# Patient Record
Sex: MALE | Race: WHITE | NOT HISPANIC OR LATINO | Employment: OTHER | URBAN - METROPOLITAN AREA
[De-identification: names, ages, dates, MRNs, and addresses within clinical notes are randomized per-mention and may not be internally consistent; named-entity substitution may affect disease eponyms.]

---

## 2020-12-11 NOTE — PROGRESS NOTES
[facility] Cardiology   Consult  Rock De Guzman  1965    VISIT DATE:  12/15/20    PCP:   Maryjane De Guzman MD  [address] [place] [id number]        CC:  Chest Pain and Abnormal ECG      Problem List:  1.  Hypertriglyceridemia  2.  Chest pain  3.  Hypertension    History of Present Illness:  Rock De Guzman  Is a 55 y.o. male with pertinent cardiac history detailed above.  Patient referred by Dr. Maryjane De Guzman for complaints of exertional chest pain over the two months.  It radiates into both arms more so left than right.  States example such as carrying groceries or walking 100 yards.  Risk factors for CAD include hypertension and hyperlipidemia.  He is on aspirin, beta-blocker, and fenofibrate for triglycerides.  EKGs have inferior T wave inversions this is stable on EKG today compared with last week.  He is prescribed metoprolol but he has not been able to fill it yet.  He is taking aspirin and fenofibrate for his triglycerides..  Feels like he should stop activity when he feels these symptoms, symptoms improve with rest within a minute.  Has not had any rest symptoms.  Blood pressures well controlled today.      There are no active problems to display for this patient.      No Known Allergies    Social History     Socioeconomic History   • Marital status:      Spouse name: Not on file   • Number of children: Not on file   • Years of education: Not on file   • Highest education level: Not on file   Tobacco Use   • Smoking status: Never Smoker   • Smokeless tobacco: Former User     Types: Chew   Substance and Sexual Activity   • Alcohol use: Not Currently   • Drug use: Never       Family History   Problem Relation Age of Onset   • Gallbladder disease Mother    • Liver cancer Father    • Hypertension Sister    • Hypertension Brother    • Diabetes type II Brother        Current Medications:    Current Outpatient Medications:   •  aspirin 325 MG tablet, Take 325 mg by mouth  "Daily., Disp: , Rfl:   •  esomeprazole (nexIUM) 40 MG capsule, Take 40 mg by mouth Every Morning Before Breakfast., Disp: , Rfl:   •  fenofibrate 160 MG tablet, Take 160 mg by mouth Daily., Disp: , Rfl:   •  losartan (COZAAR) 25 MG tablet, Take 25 mg by mouth Daily., Disp: , Rfl:   •  vitamin D3 125 MCG (5000 UT) capsule capsule, Take 5,000 Units by mouth Daily., Disp: , Rfl:   •  icosapent ethyl (Vascepa) 1 g capsule capsule, Take 2 g by mouth 2 (Two) Times a Day With Meals., Disp: 120 capsule, Rfl: 6  •  nitroglycerin (Nitrostat) 0.4 MG SL tablet, Place 1 tablet under the tongue Every 5 (Five) Minutes As Needed for Chest Pain. Take no more than 3 doses in 15 minutes., Disp: 30 tablet, Rfl: 6  •  rosuvastatin (CRESTOR) 10 MG tablet, Take 1 tablet by mouth Daily., Disp: 30 tablet, Rfl: 6     Review of Systems   Cardiovascular: Positive for chest pain. Negative for dyspnea on exertion, near-syncope, palpitations and syncope.   Respiratory: Positive for shortness of breath.    Gastrointestinal: Negative for abdominal pain.   All other systems reviewed and are negative.      Vitals:    12/15/20 1105   BP: 126/80   BP Location: Right arm   Patient Position: Sitting   Cuff Size: Adult   Pulse: 78   SpO2: 98%   Weight: 86.6 kg (191 lb)   Height: 188 cm (74\")       Physical Exam  Constitutional:       Appearance: Normal appearance. He is normal weight.   HENT:      Head: Normocephalic and atraumatic.   Eyes:      Extraocular Movements: Extraocular movements intact.   Neck:      Vascular: No carotid bruit.   Cardiovascular:      Rate and Rhythm: Normal rate and regular rhythm.      Pulses: Normal pulses.   Pulmonary:      Effort: Pulmonary effort is normal.      Breath sounds: Normal breath sounds.   Abdominal:      Palpations: Abdomen is soft.   Musculoskeletal:      Right lower leg: No edema.      Left lower leg: No edema.   Skin:     General: Skin is warm and dry.   Neurological:      General: No focal deficit present. "      Mental Status: He is alert.         Diagnostic Data:    ECG 12 Lead    Date/Time: 12/15/2020 11:32 AM  Performed by: Rocco Tabares MD  Authorized by: Rocco Tabares MD   Comparison: compared with previous ECG from 12/10/2020  Similar to previous ECG  Rhythm: sinus rhythm  Rate: normal  BPM: 78  T flattening: II, III and aVF  QRS axis: normal    Clinical impression: abnormal EKG          No results found for: CHLPL, TRIG, HDL, LDLDIRECT  No results found for: GLUCOSE, BUN, CREATININE, NA, K, CL, CO2, CREATININE, BUN  No results found for: HGBA1C  No results found for: WBC, HGB, HCT, PLT    Assessment:   Diagnosis Plan   1. Angina pectoris (CMS/HCC)  ECG 12 Lead       Plan:      1.  Stable typical Angina  -Fairly stable symptom pattern over the last 2 months  -On ASA, fenofibrate, has not been able fill metoprolol yet but will pick it up  -EKG shows T wave inversions in the inferior leads nonspecific changes in the lateral leads no ST depression or elevation  -Prescribed sublingual nitro with instructions for use  -Discussed with any rest symptoms or progression in symptoms would recommend left heart catheterization.  Recommended left heart catheterization as an option today as well as continue medical management.  Patient would like a period of medical management to see how his symptoms improve with addition of antianginals  -We will see back in about 6 weeks to reevaluate    2.  Hypertension  -currently controlled on losartan    3.  Hyperlipidemia  -Total cholesterol 180, HDL 41, LDL 75, triglycerides 320  - add Vascepa to help reduce TGs, and cardiac risk  -He is on a fenofibrate  -Add low-dose Crestor 10 mg to ensure plaque stabilization    Follow-up in 6 weeks to reevaluate symptoms.  Advised him to call with any progression of symptoms in the interim and we could schedule left heart catheterization          Rocco Tabares MD

## 2020-12-15 ENCOUNTER — CONSULT (OUTPATIENT)
Dept: CARDIOLOGY | Facility: CLINIC | Age: 55
End: 2020-12-15

## 2020-12-15 VITALS
OXYGEN SATURATION: 98 % | HEIGHT: 74 IN | HEART RATE: 78 BPM | SYSTOLIC BLOOD PRESSURE: 126 MMHG | WEIGHT: 191 LBS | BODY MASS INDEX: 24.51 KG/M2 | DIASTOLIC BLOOD PRESSURE: 80 MMHG

## 2020-12-15 DIAGNOSIS — I20.9 ANGINA PECTORIS (HCC): Primary | ICD-10-CM

## 2020-12-15 PROCEDURE — 93000 ELECTROCARDIOGRAM COMPLETE: CPT | Performed by: INTERNAL MEDICINE

## 2020-12-15 PROCEDURE — 99244 OFF/OP CNSLTJ NEW/EST MOD 40: CPT | Performed by: INTERNAL MEDICINE

## 2020-12-15 RX ORDER — FENOFIBRATE 160 MG/1
160 TABLET ORAL DAILY
COMMUNITY

## 2020-12-15 RX ORDER — NITROGLYCERIN 0.4 MG/1
0.4 TABLET SUBLINGUAL
Qty: 30 TABLET | Refills: 6 | Status: SHIPPED | OUTPATIENT
Start: 2020-12-15

## 2020-12-15 RX ORDER — ASPIRIN 325 MG
325 TABLET ORAL DAILY
COMMUNITY
End: 2021-04-08 | Stop reason: ALTCHOICE

## 2020-12-15 RX ORDER — LOSARTAN POTASSIUM 25 MG/1
25 TABLET ORAL DAILY
COMMUNITY

## 2020-12-15 RX ORDER — ICOSAPENT ETHYL 1000 MG/1
2 CAPSULE ORAL 2 TIMES DAILY WITH MEALS
Qty: 120 CAPSULE | Refills: 6 | Status: SHIPPED | OUTPATIENT
Start: 2020-12-15 | End: 2021-02-18

## 2020-12-15 RX ORDER — ROSUVASTATIN CALCIUM 10 MG/1
10 TABLET, COATED ORAL DAILY
Qty: 30 TABLET | Refills: 6 | Status: SHIPPED | OUTPATIENT
Start: 2020-12-15

## 2020-12-15 RX ORDER — ESOMEPRAZOLE MAGNESIUM 40 MG/1
40 CAPSULE, DELAYED RELEASE ORAL
COMMUNITY

## 2021-02-01 ENCOUNTER — PRIOR AUTHORIZATION (OUTPATIENT)
Dept: CARDIOLOGY | Facility: CLINIC | Age: 56
End: 2021-02-01

## 2021-02-17 NOTE — PROGRESS NOTES
[facility]  TeleHealth Follow Up Visit  Rock De Guzman  1965    VISIT DATE:  02/18/21    PCP:   Maryjane De Guzman MD  [address] [place], [place] [id number]    This patient has consented to a telehealth visit via telephone. The visit was scheduled as a telephone visit to comply with patient safety concerns in accordance with [facility] recommendations.  All vitals recorded within this visit are reported by the patient.  I spent  25 minutes in total including but not limited to the 16 minutes spent in direct conversation with this patient.        CC: Stable angina    Problem List:  1.  Hypertriglyceridemia  2.  Stable angina  3.  Hypertension    History of Present Illness:  Rock De Guzman  Is a 55 y.o. male with pertinent cardiac history detailed above.  Patient is following up for stable angina.  Was experiencing episodes of chest pain with carrying groceries or walking 100 yards.  His medical therapy has been adjusted and is currently on aspirin, metoprolol, losartan, statin, as well as a fenofibrate for increased triglycerides.  Since last visit in December he has noted decreased frequency of the chest pain and believes he can exert himself for longer periods before experiencing the chest pain.  He has not needed to take any nitroglycerin.  He has not had any episodes at rest.  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  When he does have the symptoms he feels some chest pressure that radiates into his arms.  His EKG at last visit showed inferior T wave inversions and prominent anterior T waves.  Options for management including upfront cardiac catheterization versus optimization of medical management were discussed at the last visit and he preferred to strategy of medical management.  Now that his chest pain symptoms sound stable will plan for echo and stress testing for further risk stratification      There  are no active problems to display for this patient.      No Known Allergies    Social History     Socioeconomic History   • Marital status:      Spouse name: Not on file   • Number of children: Not on file   • Years of education: Not on file   • Highest education level: Not on file   Tobacco Use   • Smoking status: Never Smoker   • Smokeless tobacco: Former User     Types: Chew   Substance and Sexual Activity   • Alcohol use: Not Currently   • Drug use: Never   • Sexual activity: Defer       Family History   Problem Relation Age of Onset   • Gallbladder disease Mother    • Liver cancer Father    • Hypertension Sister    • Hypertension Brother    • Diabetes type II Brother        Current Medications:    Current Outpatient Medications:   •  aspirin 325 MG tablet, Take 325 mg by mouth Daily., Disp: , Rfl:   •  esomeprazole (nexIUM) 40 MG capsule, Take 40 mg by mouth Every Morning Before Breakfast., Disp: , Rfl:   •  fenofibrate 160 MG tablet, Take 160 mg by mouth Daily., Disp: , Rfl:   •  losartan (COZAAR) 25 MG tablet, Take 25 mg by mouth Daily., Disp: , Rfl:   •  metoprolol tartrate (LOPRESSOR) 25 MG tablet, Take 25 mg by mouth 2 (Two) Times a Day., Disp: , Rfl:   •  nitroglycerin (Nitrostat) 0.4 MG SL tablet, Place 1 tablet under the tongue Every 5 (Five) Minutes As Needed for Chest Pain. Take no more than 3 doses in 15 minutes., Disp: 30 tablet, Rfl: 6  •  rosuvastatin (CRESTOR) 10 MG tablet, Take 1 tablet by mouth Daily., Disp: 30 tablet, Rfl: 6  •  vitamin D3 125 MCG (5000 UT) capsule capsule, Take 5,000 Units by mouth Daily., Disp: , Rfl:      Review of Systems   Cardiovascular: Positive for chest pain (Decreased frequency and severity compared with previous visit). Negative for dyspnea on exertion, leg swelling, near-syncope, orthopnea, palpitations and syncope.   Respiratory: Negative for cough and shortness of breath.        Vitals:    02/18/21 1002   BP: 120/71   BP Location: Left arm   Patient  "Position: Sitting   Pulse: 60   Weight: 104 kg (230 lb 3.2 oz)   Height: 188 cm (74\")       Physical Exam  Pulmonary:      Effort: Pulmonary effort is normal.   Neurological:      Mental Status: He is alert and oriented to person, place, and time.   Psychiatric:         Mood and Affect: Mood normal.         Behavior: Behavior normal.         Diagnostic Data:  Procedures  No results found for: CHLPL, TRIG, HDL, LDLDIRECT  No results found for: GLUCOSE, BUN, CREATININE, NA, K, CL, CO2, CREATININE, BUN  No results found for: HGBA1C  No results found for: WBC, HGB, HCT, PLT    Assessment:   Diagnosis Plan   1. Angina pectoris (CMS/HCC)  Adult Transthoracic Echo Complete W/ Cont if Necessary Per Protocol    Stress Test With Myocardial Perfusion       Plan:    1.  Stable angina  -Fairly stable symptom pattern, symptoms sound to have been improved on increased medical therapy  -On ASA, fenofibrate, metoprolol 25mg BID, Crestor, Omega-3 1000mg BID (vascepa was denied by insurance)  -EKGs from December show T wave inversions in the inferior leads nonspecific changes in the lateral leads  no ST depression or elevation, prominent T waves in the anterior leads  -has sublingual nitro with instructions for use, has not used  -Discussed with any rest symptoms or progression in symptoms would recommend left heart catheterization.   -At this time patient is improving on medical therapy and we will get an echo and exercise nuclear stress test for further stratification     2.  Hypertension  -currently controlled on losartan and metoprolol     3.  Hyperlipidemia  -Total cholesterol 180, HDL 41, LDL 75, triglycerides 320  - On fenofibrate and crestor 10mg  -Omega-3 1000mg BID (vascepa was denied by insurance)  -He will follow up with Dr. De Guzman to get repeat lipids and I asked him to get a repeat EKG as well    We will follow-up with me in 4 to 6 weeks after echo and stress test.  Again reviewed chest pain precautions          Rocco " TWAN Tabares MD

## 2021-02-18 ENCOUNTER — OFFICE VISIT (OUTPATIENT)
Dept: CARDIOLOGY | Facility: CLINIC | Age: 56
End: 2021-02-18

## 2021-02-18 VITALS
WEIGHT: 230.2 LBS | SYSTOLIC BLOOD PRESSURE: 120 MMHG | DIASTOLIC BLOOD PRESSURE: 71 MMHG | HEART RATE: 60 BPM | BODY MASS INDEX: 29.54 KG/M2 | HEIGHT: 74 IN

## 2021-02-18 DIAGNOSIS — I20.9 ANGINA PECTORIS (HCC): Primary | ICD-10-CM

## 2021-02-18 PROCEDURE — 99442 PR PHYS/QHP TELEPHONE EVALUATION 11-20 MIN: CPT | Performed by: INTERNAL MEDICINE

## 2021-03-10 ENCOUNTER — HOSPITAL ENCOUNTER (OUTPATIENT)
Dept: CARDIOLOGY | Facility: HOSPITAL | Age: 56
End: 2021-03-10

## 2021-03-10 ENCOUNTER — APPOINTMENT (OUTPATIENT)
Dept: CARDIOLOGY | Facility: HOSPITAL | Age: 56
End: 2021-03-10

## 2021-03-11 ENCOUNTER — HOSPITAL ENCOUNTER (OUTPATIENT)
Dept: CARDIOLOGY | Facility: HOSPITAL | Age: 56
Discharge: HOME | End: 2021-03-11

## 2021-03-11 ENCOUNTER — PREP FOR SURGERY (OUTPATIENT)
Dept: OTHER | Facility: HOSPITAL | Age: 56
End: 2021-03-11

## 2021-03-11 ENCOUNTER — HOSPITAL ENCOUNTER (OUTPATIENT)
Dept: CARDIOLOGY | Facility: HOSPITAL | Age: 56
Discharge: HOME | End: 2021-03-11
Admitting: INTERNAL MEDICINE

## 2021-03-11 VITALS — BODY MASS INDEX: 29.52 KG/M2 | WEIGHT: 230 LBS | HEIGHT: 74 IN

## 2021-03-11 VITALS
SYSTOLIC BLOOD PRESSURE: 130 MMHG | WEIGHT: 230 LBS | HEIGHT: 74 IN | HEART RATE: 65 BPM | DIASTOLIC BLOOD PRESSURE: 86 MMHG | BODY MASS INDEX: 29.52 KG/M2

## 2021-03-11 DIAGNOSIS — I20.9 ANGINA PECTORIS (HCC): Primary | ICD-10-CM

## 2021-03-11 DIAGNOSIS — I20.9 ANGINA PECTORIS (HCC): ICD-10-CM

## 2021-03-11 LAB
BH CV ECHO MEAS - AO ROOT AREA (BSA CORRECTED): 1.5
BH CV ECHO MEAS - AO ROOT AREA: 8.8 CM^2
BH CV ECHO MEAS - AO ROOT DIAM: 3.3 CM
BH CV ECHO MEAS - BSA(HAYCOCK): 2.3 M^2
BH CV ECHO MEAS - BSA: 2.3 M^2
BH CV ECHO MEAS - BZI_BMI: 29.8 KILOGRAMS/M^2
BH CV ECHO MEAS - BZI_METRIC_HEIGHT: 187 CM
BH CV ECHO MEAS - BZI_METRIC_WEIGHT: 104.3 KG
BH CV ECHO MEAS - EDV(CUBED): 107.7 ML
BH CV ECHO MEAS - EDV(MOD-SP2): 187 ML
BH CV ECHO MEAS - EDV(MOD-SP4): 144 ML
BH CV ECHO MEAS - EDV(TEICH): 105.3 ML
BH CV ECHO MEAS - EF(CUBED): 71.9 %
BH CV ECHO MEAS - EF(MOD-BP): 51 %
BH CV ECHO MEAS - EF(MOD-SP2): 51.7 %
BH CV ECHO MEAS - EF(MOD-SP4): 51.5 %
BH CV ECHO MEAS - EF(TEICH): 63.6 %
BH CV ECHO MEAS - ESV(CUBED): 30.2 ML
BH CV ECHO MEAS - ESV(MOD-SP2): 90.3 ML
BH CV ECHO MEAS - ESV(MOD-SP4): 69.9 ML
BH CV ECHO MEAS - ESV(TEICH): 38.4 ML
BH CV ECHO MEAS - FS: 34.5 %
BH CV ECHO MEAS - IVS/LVPW: 0.88
BH CV ECHO MEAS - IVSD: 0.89 CM
BH CV ECHO MEAS - LA DIMENSION: 4.3 CM
BH CV ECHO MEAS - LA/AO: 1.3
BH CV ECHO MEAS - LAD MAJOR: 4.5 CM
BH CV ECHO MEAS - LAT PEAK E' VEL: 12 CM/SEC
BH CV ECHO MEAS - LATERAL E/E' RATIO: 5.5
BH CV ECHO MEAS - LV DIASTOLIC VOL/BSA (35-75): 62.7 ML/M^2
BH CV ECHO MEAS - LV IVRT: 0.1 SEC
BH CV ECHO MEAS - LV MASS(C)D: 155.9 GRAMS
BH CV ECHO MEAS - LV MASS(C)DI: 67.9 GRAMS/M^2
BH CV ECHO MEAS - LV MAX PG: 3.9 MMHG
BH CV ECHO MEAS - LV MEAN PG: 1.8 MMHG
BH CV ECHO MEAS - LV SYSTOLIC VOL/BSA (12-30): 30.4 ML/M^2
BH CV ECHO MEAS - LV V1 MAX: 99 CM/SEC
BH CV ECHO MEAS - LV V1 MEAN: 59.3 CM/SEC
BH CV ECHO MEAS - LV V1 VTI: 19.8 CM
BH CV ECHO MEAS - LVIDD: 4.8 CM
BH CV ECHO MEAS - LVIDS: 3.1 CM
BH CV ECHO MEAS - LVLD AP2: 10.8 CM
BH CV ECHO MEAS - LVLD AP4: 10.9 CM
BH CV ECHO MEAS - LVLS AP2: 9.7 CM
BH CV ECHO MEAS - LVLS AP4: 9.4 CM
BH CV ECHO MEAS - LVOT AREA (M): 3.1 CM^2
BH CV ECHO MEAS - LVOT AREA: 3.3 CM^2
BH CV ECHO MEAS - LVOT DIAM: 2 CM
BH CV ECHO MEAS - LVPWD: 1 CM
BH CV ECHO MEAS - MED PEAK E' VEL: 7.6 CM/SEC
BH CV ECHO MEAS - MEDIAL E/E' RATIO: 8.7
BH CV ECHO MEAS - MV A MAX VEL: 80.6 CM/SEC
BH CV ECHO MEAS - MV DEC SLOPE: 398.8 CM/SEC^2
BH CV ECHO MEAS - MV DEC TIME: 0.17 SEC
BH CV ECHO MEAS - MV E MAX VEL: 66.4 CM/SEC
BH CV ECHO MEAS - MV E/A: 0.82
BH CV ECHO MEAS - MV MAX PG: 2.6 MMHG
BH CV ECHO MEAS - MV MEAN PG: 1.4 MMHG
BH CV ECHO MEAS - MV P1/2T MAX VEL: 83 CM/SEC
BH CV ECHO MEAS - MV P1/2T: 60.9 MSEC
BH CV ECHO MEAS - MV V2 MAX: 79.9 CM/SEC
BH CV ECHO MEAS - MV V2 MEAN: 55.4 CM/SEC
BH CV ECHO MEAS - MV V2 VTI: 24.4 CM
BH CV ECHO MEAS - MVA P1/2T LCG: 2.7 CM^2
BH CV ECHO MEAS - MVA(P1/2T): 3.6 CM^2
BH CV ECHO MEAS - MVA(VTI): 2.7 CM^2
BH CV ECHO MEAS - PA ACC TIME: 0.11 SEC
BH CV ECHO MEAS - PA PR(ACCEL): 31.5 MMHG
BH CV ECHO MEAS - SI(CUBED): 33.7 ML/M^2
BH CV ECHO MEAS - SI(LVOT): 28.2 ML/M^2
BH CV ECHO MEAS - SI(MOD-SP2): 42.1 ML/M^2
BH CV ECHO MEAS - SI(MOD-SP4): 32.3 ML/M^2
BH CV ECHO MEAS - SI(TEICH): 29.1 ML/M^2
BH CV ECHO MEAS - SV(CUBED): 77.5 ML
BH CV ECHO MEAS - SV(LVOT): 64.8 ML
BH CV ECHO MEAS - SV(MOD-SP2): 96.7 ML
BH CV ECHO MEAS - SV(MOD-SP4): 74.1 ML
BH CV ECHO MEAS - SV(TEICH): 67 ML
BH CV ECHO MEAS - TAPSE (>1.6): 2 CM
BH CV ECHO MEASUREMENTS AVERAGE E/E' RATIO: 6.78
BH CV REST NUCLEAR ISOTOPE DOSE: 9.7 MCI
BH CV STRESS BP STAGE 1: NORMAL
BH CV STRESS BP STAGE 2: NORMAL
BH CV STRESS BP STAGE 3: NORMAL
BH CV STRESS DURATION MIN STAGE 1: 3
BH CV STRESS DURATION MIN STAGE 2: 3
BH CV STRESS DURATION MIN STAGE 3: 3
BH CV STRESS DURATION SEC STAGE 1: 0
BH CV STRESS DURATION SEC STAGE 2: 0
BH CV STRESS DURATION SEC STAGE 3: 21
BH CV STRESS GRADE STAGE 1: 10
BH CV STRESS GRADE STAGE 2: 12
BH CV STRESS GRADE STAGE 3: 14
BH CV STRESS HR STAGE 1: 109
BH CV STRESS HR STAGE 2: 123
BH CV STRESS HR STAGE 3: 137
BH CV STRESS METS STAGE 1: 5
BH CV STRESS METS STAGE 2: 7.5
BH CV STRESS METS STAGE 3: 10
BH CV STRESS NUCLEAR ISOTOPE DOSE: 33 MCI
BH CV STRESS O2 STAGE 1: 98
BH CV STRESS O2 STAGE 2: 99
BH CV STRESS O2 STAGE 3: 99
BH CV STRESS PROTOCOL 1: NORMAL
BH CV STRESS RECOVERY BP: NORMAL MMHG
BH CV STRESS RECOVERY HR: 80 BPM
BH CV STRESS RECOVERY O2: 97 %
BH CV STRESS SPEED STAGE 1: 1.7
BH CV STRESS SPEED STAGE 2: 2.5
BH CV STRESS SPEED STAGE 3: 3.4
BH CV STRESS STAGE 1: 1
BH CV STRESS STAGE 2: 2
BH CV STRESS STAGE 3: 3
BH CV VAS BP LEFT ARM: NORMAL MMHG
BH CV XLRA - RV BASE: 4.6 CM
BH CV XLRA - RV LENGTH: 7.9 CM
BH CV XLRA - RV MID: 2.5 CM
BH CV XLRA - TDI S': 15.3 CM/SEC
LEFT ATRIUM VOLUME INDEX: 22.4 ML/M^2
LEFT ATRIUM VOLUME: 51.5 ML
LV EF NUC BP: 60 %
MAXIMAL PREDICTED HEART RATE: 165 BPM
MAXIMAL PREDICTED HEART RATE: 165 BPM
PERCENT MAX PREDICTED HR: 84.24 %
STRESS BASELINE BP: NORMAL MMHG
STRESS BASELINE HR: 85 BPM
STRESS O2 SAT REST: 98 %
STRESS PERCENT HR: 99 %
STRESS POST ESTIMATED WORKLOAD: 10.1 METS
STRESS POST EXERCISE DUR MIN: 9 MIN
STRESS POST EXERCISE DUR SEC: 21 SEC
STRESS POST O2 SAT PEAK: 99 %
STRESS POST PEAK BP: NORMAL MMHG
STRESS POST PEAK HR: 139 BPM
STRESS TARGET HR: 140 BPM
STRESS TARGET HR: 140 BPM

## 2021-03-11 PROCEDURE — 78452 HT MUSCLE IMAGE SPECT MULT: CPT | Performed by: INTERNAL MEDICINE

## 2021-03-11 PROCEDURE — 93017 CV STRESS TEST TRACING ONLY: CPT

## 2021-03-11 PROCEDURE — 78452 HT MUSCLE IMAGE SPECT MULT: CPT

## 2021-03-11 PROCEDURE — A9500 TC99M SESTAMIBI: HCPCS | Performed by: INTERNAL MEDICINE

## 2021-03-11 PROCEDURE — 93018 CV STRESS TEST I&R ONLY: CPT | Performed by: INTERNAL MEDICINE

## 2021-03-11 PROCEDURE — 0 TECHNETIUM SESTAMIBI: Performed by: INTERNAL MEDICINE

## 2021-03-11 PROCEDURE — 93306 TTE W/DOPPLER COMPLETE: CPT | Performed by: INTERNAL MEDICINE

## 2021-03-11 PROCEDURE — 93306 TTE W/DOPPLER COMPLETE: CPT

## 2021-03-11 RX ORDER — CLOPIDOGREL BISULFATE 75 MG/1
600 TABLET ORAL ONCE
Status: CANCELLED | OUTPATIENT
Start: 2021-03-11 | End: 2021-03-11

## 2021-03-11 RX ORDER — CLOPIDOGREL BISULFATE 75 MG/1
75 TABLET ORAL DAILY
Status: CANCELLED | OUTPATIENT
Start: 2021-03-12

## 2021-03-11 RX ORDER — SODIUM CHLORIDE 0.9 % (FLUSH) 0.9 %
3 SYRINGE (ML) INJECTION EVERY 12 HOURS SCHEDULED
Status: CANCELLED | OUTPATIENT
Start: 2021-03-11

## 2021-03-11 RX ORDER — SODIUM CHLORIDE 0.9 % (FLUSH) 0.9 %
10 SYRINGE (ML) INJECTION AS NEEDED
Status: CANCELLED | OUTPATIENT
Start: 2021-03-11

## 2021-03-11 RX ORDER — ACETAMINOPHEN 325 MG/1
650 TABLET ORAL EVERY 4 HOURS PRN
Status: CANCELLED | OUTPATIENT
Start: 2021-03-11

## 2021-03-11 RX ORDER — NITROGLYCERIN 0.4 MG/1
0.4 TABLET SUBLINGUAL
Status: CANCELLED | OUTPATIENT
Start: 2021-03-11

## 2021-03-11 RX ADMIN — TECHNETIUM TC 99M SESTAMIBI 1 DOSE: 1 INJECTION INTRAVENOUS at 10:35

## 2021-03-11 RX ADMIN — TECHNETIUM TC 99M SESTAMIBI 1 DOSE: 1 INJECTION INTRAVENOUS at 12:40

## 2021-03-12 ENCOUNTER — HOSPITAL ENCOUNTER (OUTPATIENT)
Facility: HOSPITAL | Age: 56
Discharge: HOME | End: 2021-03-12
Attending: INTERNAL MEDICINE | Admitting: INTERNAL MEDICINE

## 2021-03-12 VITALS
WEIGHT: 237.8 LBS | SYSTOLIC BLOOD PRESSURE: 134 MMHG | TEMPERATURE: 97.8 F | RESPIRATION RATE: 18 BRPM | HEIGHT: 74 IN | OXYGEN SATURATION: 91 % | HEART RATE: 89 BPM | BODY MASS INDEX: 30.52 KG/M2 | DIASTOLIC BLOOD PRESSURE: 102 MMHG

## 2021-03-12 DIAGNOSIS — I20.9 ANGINA PECTORIS (HCC): ICD-10-CM

## 2021-03-12 LAB
ALBUMIN SERPL-MCNC: 4.3 G/DL (ref 3.5–5.2)
ALBUMIN/GLOB SERPL: 2.2 G/DL
ALP SERPL-CCNC: 68 U/L (ref 39–117)
ALT SERPL W P-5'-P-CCNC: 38 U/L (ref 1–41)
ANION GAP SERPL CALCULATED.3IONS-SCNC: 11 MMOL/L (ref 5–15)
AST SERPL-CCNC: 25 U/L (ref 1–40)
BILIRUB SERPL-MCNC: 0.3 MG/DL (ref 0–1.2)
BUN SERPL-MCNC: 22 MG/DL (ref 6–20)
BUN/CREAT SERPL: 18.8 (ref 7–25)
CALCIUM SPEC-SCNC: 9.2 MG/DL (ref 8.6–10.5)
CHLORIDE SERPL-SCNC: 106 MMOL/L (ref 98–107)
CHOLEST SERPL-MCNC: 96 MG/DL (ref 0–200)
CO2 SERPL-SCNC: 24 MMOL/L (ref 22–29)
CREAT SERPL-MCNC: 1.17 MG/DL (ref 0.76–1.27)
DEPRECATED RDW RBC AUTO: 39.8 FL (ref 37–54)
ERYTHROCYTE [DISTWIDTH] IN BLOOD BY AUTOMATED COUNT: 12.6 % (ref 12.3–15.4)
GFR SERPL CREATININE-BSD FRML MDRD: 65 ML/MIN/1.73
GLOBULIN UR ELPH-MCNC: 2 GM/DL
GLUCOSE SERPL-MCNC: 127 MG/DL (ref 65–99)
HBA1C MFR BLD: 6.3 % (ref 4.8–5.6)
HCT VFR BLD AUTO: 39.7 % (ref 37.5–51)
HDLC SERPL-MCNC: 37 MG/DL (ref 40–60)
HGB BLD-MCNC: 13.1 G/DL (ref 13–17.7)
LDLC SERPL CALC-MCNC: 35 MG/DL (ref 0–100)
LDLC/HDLC SERPL: 0.87 {RATIO}
MCH RBC QN AUTO: 28.6 PG (ref 26.6–33)
MCHC RBC AUTO-ENTMCNC: 33 G/DL (ref 31.5–35.7)
MCV RBC AUTO: 86.7 FL (ref 79–97)
PLATELET # BLD AUTO: 207 10*3/MM3 (ref 140–450)
PMV BLD AUTO: 9.8 FL (ref 6–12)
POTASSIUM SERPL-SCNC: 4.2 MMOL/L (ref 3.5–5.2)
PROT SERPL-MCNC: 6.3 G/DL (ref 6–8.5)
RBC # BLD AUTO: 4.58 10*6/MM3 (ref 4.14–5.8)
SODIUM SERPL-SCNC: 141 MMOL/L (ref 136–145)
TRIGL SERPL-MCNC: 134 MG/DL (ref 0–150)
VLDLC SERPL-MCNC: 24 MG/DL (ref 5–40)
WBC # BLD AUTO: 6.63 10*3/MM3 (ref 3.4–10.8)

## 2021-03-12 PROCEDURE — 93458 L HRT ARTERY/VENTRICLE ANGIO: CPT | Performed by: INTERNAL MEDICINE

## 2021-03-12 PROCEDURE — 25010000002 HEPARIN (PORCINE) PER 1000 UNITS: Performed by: INTERNAL MEDICINE

## 2021-03-12 PROCEDURE — 80061 LIPID PANEL: CPT | Performed by: PHYSICIAN ASSISTANT

## 2021-03-12 PROCEDURE — 25010000002 FENTANYL CITRATE (PF) 100 MCG/2ML SOLUTION: Performed by: INTERNAL MEDICINE

## 2021-03-12 PROCEDURE — 25010000002 ONDANSETRON PER 1 MG: Performed by: INTERNAL MEDICINE

## 2021-03-12 PROCEDURE — 80053 COMPREHEN METABOLIC PANEL: CPT | Performed by: PHYSICIAN ASSISTANT

## 2021-03-12 PROCEDURE — 83036 HEMOGLOBIN GLYCOSYLATED A1C: CPT | Performed by: PHYSICIAN ASSISTANT

## 2021-03-12 PROCEDURE — 85027 COMPLETE CBC AUTOMATED: CPT | Performed by: PHYSICIAN ASSISTANT

## 2021-03-12 PROCEDURE — 0 IOPAMIDOL PER 1 ML: Performed by: INTERNAL MEDICINE

## 2021-03-12 PROCEDURE — 25010000002 MIDAZOLAM PER 1 MG: Performed by: INTERNAL MEDICINE

## 2021-03-12 PROCEDURE — C1887 CATHETER, GUIDING: HCPCS | Performed by: INTERNAL MEDICINE

## 2021-03-12 PROCEDURE — 85347 COAGULATION TIME ACTIVATED: CPT

## 2021-03-12 PROCEDURE — C1769 GUIDE WIRE: HCPCS | Performed by: INTERNAL MEDICINE

## 2021-03-12 PROCEDURE — C1894 INTRO/SHEATH, NON-LASER: HCPCS | Performed by: INTERNAL MEDICINE

## 2021-03-12 PROCEDURE — C1760 CLOSURE DEV, VASC: HCPCS | Performed by: INTERNAL MEDICINE

## 2021-03-12 PROCEDURE — 25010000002 MORPHINE PER 10 MG: Performed by: INTERNAL MEDICINE

## 2021-03-12 RX ORDER — NITROGLYCERIN 0.4 MG/1
0.4 TABLET SUBLINGUAL
Status: DISCONTINUED | OUTPATIENT
Start: 2021-03-12 | End: 2021-03-12 | Stop reason: HOSPADM

## 2021-03-12 RX ORDER — SODIUM CHLORIDE 0.9 % (FLUSH) 0.9 %
10 SYRINGE (ML) INJECTION AS NEEDED
Status: DISCONTINUED | OUTPATIENT
Start: 2021-03-12 | End: 2021-03-12 | Stop reason: HOSPADM

## 2021-03-12 RX ORDER — CHLORAL HYDRATE 500 MG
1000 CAPSULE ORAL 2 TIMES DAILY WITH MEALS
COMMUNITY

## 2021-03-12 RX ORDER — LIDOCAINE HYDROCHLORIDE 10 MG/ML
INJECTION, SOLUTION EPIDURAL; INFILTRATION; INTRACAUDAL; PERINEURAL AS NEEDED
Status: DISCONTINUED | OUTPATIENT
Start: 2021-03-12 | End: 2021-03-12 | Stop reason: HOSPADM

## 2021-03-12 RX ORDER — HEPARIN SODIUM 1000 [USP'U]/ML
INJECTION, SOLUTION INTRAVENOUS; SUBCUTANEOUS AS NEEDED
Status: DISCONTINUED | OUTPATIENT
Start: 2021-03-12 | End: 2021-03-12 | Stop reason: HOSPADM

## 2021-03-12 RX ORDER — ACETAMINOPHEN 325 MG/1
650 TABLET ORAL EVERY 4 HOURS PRN
Status: DISCONTINUED | OUTPATIENT
Start: 2021-03-12 | End: 2021-03-12 | Stop reason: HOSPADM

## 2021-03-12 RX ORDER — MORPHINE SULFATE 2 MG/ML
2 INJECTION, SOLUTION INTRAMUSCULAR; INTRAVENOUS ONCE
Status: COMPLETED | OUTPATIENT
Start: 2021-03-12 | End: 2021-03-12

## 2021-03-12 RX ORDER — CLOPIDOGREL BISULFATE 75 MG/1
600 TABLET ORAL ONCE
Status: COMPLETED | OUTPATIENT
Start: 2021-03-12 | End: 2021-03-12

## 2021-03-12 RX ORDER — CLOPIDOGREL BISULFATE 75 MG/1
75 TABLET ORAL DAILY
Status: DISCONTINUED | OUTPATIENT
Start: 2021-03-13 | End: 2021-03-12 | Stop reason: HOSPADM

## 2021-03-12 RX ORDER — MIDAZOLAM HYDROCHLORIDE 1 MG/ML
INJECTION INTRAMUSCULAR; INTRAVENOUS AS NEEDED
Status: DISCONTINUED | OUTPATIENT
Start: 2021-03-12 | End: 2021-03-12 | Stop reason: HOSPADM

## 2021-03-12 RX ORDER — FENTANYL CITRATE 50 UG/ML
INJECTION, SOLUTION INTRAMUSCULAR; INTRAVENOUS AS NEEDED
Status: DISCONTINUED | OUTPATIENT
Start: 2021-03-12 | End: 2021-03-12 | Stop reason: HOSPADM

## 2021-03-12 RX ORDER — ONDANSETRON 2 MG/ML
4 INJECTION INTRAMUSCULAR; INTRAVENOUS EVERY 6 HOURS PRN
Status: DISCONTINUED | OUTPATIENT
Start: 2021-03-12 | End: 2021-03-12 | Stop reason: HOSPADM

## 2021-03-12 RX ORDER — SODIUM CHLORIDE 0.9 % (FLUSH) 0.9 %
3 SYRINGE (ML) INJECTION EVERY 12 HOURS SCHEDULED
Status: DISCONTINUED | OUTPATIENT
Start: 2021-03-12 | End: 2021-03-12 | Stop reason: HOSPADM

## 2021-03-12 RX ADMIN — CLOPIDOGREL BISULFATE 600 MG: 75 TABLET ORAL at 08:31

## 2021-03-12 RX ADMIN — MORPHINE SULFATE 2 MG: 2 INJECTION, SOLUTION INTRAMUSCULAR; INTRAVENOUS at 11:45

## 2021-03-12 RX ADMIN — ONDANSETRON 4 MG: 2 INJECTION INTRAMUSCULAR; INTRAVENOUS at 11:45

## 2021-03-12 NOTE — INTERVAL H&P NOTE
"  H&P reviewed. The patient was examined and the following changes are noted:  · Patient underwent stress MPS which was abnormal as noted below demonstrating ischemia in inferior, lateral and basal inferior lateral walls, normal LVEF.   · He presents for cardiac catheterization today      Vitals and Labs today:  Vitals:    03/12/21 0717 03/12/21 0719   BP: 125/78 119/76   BP Location: Right arm Left arm   Pulse: 69 70   Temp: 97.8 °F (36.6 °C)    TempSrc: Tympanic    SpO2:  96%   Weight: 108 kg (237 lb 12.8 oz)    Height: 188 cm (74\")      Lab Results   Component Value Date    WBC 6.63 03/12/2021    HGB 13.1 03/12/2021    HCT 39.7 03/12/2021    MCV 86.7 03/12/2021     03/12/2021     Lab Results   Component Value Date    GLUCOSE 127 (H) 03/12/2021    BUN 22 (H) 03/12/2021    CREATININE 1.17 03/12/2021    EGFRIFNONA 65 03/12/2021    BCR 18.8 03/12/2021    K 4.2 03/12/2021    CO2 24.0 03/12/2021    CALCIUM 9.2 03/12/2021    ALBUMIN 4.30 03/12/2021    AST 25 03/12/2021    ALT 38 03/12/2021     Lab Results   Component Value Date    CHOL 96 03/12/2021    TRIG 134 03/12/2021    HDL 37 (L) 03/12/2021    LDL 35 03/12/2021     No results found for: HGBA1C    Echo 3/11/21:  Interpretation Summary    · Left ventricular ejection fraction appears to be 51 - 55%. Left ventricular systolic function is normal.  · There are no significant valvular abnormalities noted     Stress MPS 3/11/21:  Interpretation Summary    · Patient did complain of left sided chest pain 2/10 and bilateral arm heaviness in stage II. The patient had to stop secondary to fatigue. Chest pain resolved in recovery.  ·  which he was unable to reach secondary to fatigue. Did get HR to 139 (84% of target)  · There was 1 mm of horizontal ST depression in lead I, lead aVL, lead V2 during exercise. This was resolved by 5:50 into recovery  · Myocardial perfusion imaging indicates a medium-to-large-sized, severe area of ischemia located in the inferior " wall, lateral wall and basal inferior lateral wall.  · Left ventricular ejection fraction is normal. (Calculated EF = 60%). There is inferior, inferolateral, and lateral hypokinesis  · There is no significant coronary artery calcium on the CT portion of the exam  · Impressions are consistent with an intermediate risk study.

## 2021-03-15 ENCOUNTER — DOCUMENTATION (OUTPATIENT)
Dept: CARDIAC REHAB | Facility: HOSPITAL | Age: 56
End: 2021-03-15

## 2021-03-15 LAB
ACT BLD: 235 SECONDS (ref 82–152)
ACT BLD: 252 SECONDS (ref 82–152)

## 2021-04-08 ENCOUNTER — OFFICE VISIT (OUTPATIENT)
Dept: CARDIOLOGY | Facility: CLINIC | Age: 56
End: 2021-04-08

## 2021-04-08 VITALS
OXYGEN SATURATION: 97 % | DIASTOLIC BLOOD PRESSURE: 66 MMHG | TEMPERATURE: 97.5 F | BODY MASS INDEX: 31.06 KG/M2 | HEART RATE: 60 BPM | SYSTOLIC BLOOD PRESSURE: 110 MMHG | HEIGHT: 74 IN | WEIGHT: 242 LBS

## 2021-04-08 DIAGNOSIS — I20.9 ANGINA PECTORIS (HCC): Primary | ICD-10-CM

## 2021-04-08 PROCEDURE — 99214 OFFICE O/P EST MOD 30 MIN: CPT | Performed by: INTERNAL MEDICINE

## 2021-04-08 RX ORDER — IBUPROFEN 400 MG/1
400 TABLET ORAL AS NEEDED
COMMUNITY
End: 2021-10-12

## 2021-04-08 RX ORDER — ASPIRIN 81 MG/1
81 TABLET ORAL DAILY
Qty: 30 TABLET | Refills: 6 | Status: SHIPPED | OUTPATIENT
Start: 2021-04-08

## 2021-04-08 NOTE — PROGRESS NOTES
[facility] Cardiology  Follow Up Visit  Chris De Guzman  1965    VISIT DATE:  04/08/21    PCP:   Maryjane De Guzman MD  [address] [place], [place] [id number]          CC:  Chest Pain      Problem List:  1. Coronary artery disease   -Left heart catheterization March 2021:  of the RCA with  well-developed left-to-right,     collaterals, no significant LAD or circumflex disease, EF 55%   -Echo EF 51-55%, no significant valvular abnormalities  2. Hyperlipidemia/hypertriglyceridemia  3.  Hypertension    History of Present Illness:  Chris De Guzman  Is a 56 y.o. male with pertinent cardiac history detailed above. Patient is following up for CAD and stable angina. Heart catheterization just demonstrated one-vessel CAD with a right coronary artery .  Doing well from a chest pain stand point.  Did have a right femoral hematoma post cath.  This has now resolved.  Blood pressure, lipids are well controlled.  Remaining active.      Patient Active Problem List    Diagnosis Date Noted   • Angina pectoris (CMS/[facility]) 03/11/2021     Note Last Updated: 3/11/2021     Added automatically from request for surgery [id number]         No Known Allergies    Social History     Socioeconomic History   • Marital status:      Spouse name: Not on file   • Number of children: Not on file   • Years of education: Not on file   • Highest education level: Not on file   Tobacco Use   • Smoking status: Never Smoker   • Smokeless tobacco: Former User     Types: Chew   Substance and Sexual Activity   • Alcohol use: Not Currently   • Drug use: Never   • Sexual activity: Defer       Family History   Problem Relation Age of Onset   • Gallbladder disease Mother    • Liver cancer Father    • Hypertension Sister    • Hypertension Brother    • Diabetes type II Brother        Current Medications:    Current Outpatient Medications:   •  esomeprazole (nexIUM) 40 MG capsule, Take 40 mg by mouth Every Morning Before  "Breakfast., Disp: , Rfl:   •  fenofibrate 160 MG tablet, Take 160 mg by mouth Daily., Disp: , Rfl:   •  ibuprofen (ADVIL,MOTRIN) 400 MG tablet, Take 400 mg by mouth As Needed., Disp: , Rfl:   •  losartan (COZAAR) 25 MG tablet, Take 25 mg by mouth Daily., Disp: , Rfl:   •  metoprolol tartrate (LOPRESSOR) 25 MG tablet, Take 25 mg by mouth 2 (Two) Times a Day., Disp: , Rfl:   •  nitroglycerin (Nitrostat) 0.4 MG SL tablet, Place 1 tablet under the tongue Every 5 (Five) Minutes As Needed for Chest Pain. Take no more than 3 doses in 15 minutes., Disp: 30 tablet, Rfl: 6  •  Omega-3 Fatty Acids (fish oil) 1000 MG capsule capsule, Take 1,000 mg by mouth 2 (Two) Times a Day With Meals., Disp: , Rfl:   •  rosuvastatin (CRESTOR) 10 MG tablet, Take 1 tablet by mouth Daily., Disp: 30 tablet, Rfl: 6  •  vitamin D3 125 MCG (5000 UT) capsule capsule, Take 5,000 Units by mouth Daily., Disp: , Rfl:   •  aspirin 81 MG EC tablet, Take 1 tablet by mouth Daily., Disp: 30 tablet, Rfl: 6     Review of Systems   Cardiovascular: Negative for chest pain, dyspnea on exertion, irregular heartbeat, near-syncope, palpitations and syncope.   Respiratory: Negative for shortness of breath.        Vitals:    04/08/21 0926   BP: 110/66   BP Location: Left arm   Patient Position: Sitting   Pulse: 60   Temp: 97.5 °F (36.4 °C)   SpO2: 97%   Weight: 110 kg (242 lb)   Height: 188 cm (74\")       Physical Exam  Constitutional:       Appearance: Normal appearance.   Neck:      Vascular: No carotid bruit.   Cardiovascular:      Rate and Rhythm: Normal rate and regular rhythm.      Pulses: Normal pulses.      Heart sounds: Normal heart sounds. No murmur heard.        Comments: 2+ right femoral pulse, no residual hematoma  Pulmonary:      Effort: Pulmonary effort is normal.      Breath sounds: No rales.   Musculoskeletal:      Right lower leg: No edema.      Left lower leg: No edema.   Neurological:      General: No focal deficit present.      Mental Status: He " is alert.         Diagnostic Data:  Procedures  Lab Results   Component Value Date    TRIG 134 03/12/2021    HDL 37 (L) 03/12/2021     Lab Results   Component Value Date    GLUCOSE 127 (H) 03/12/2021    BUN 22 (H) 03/12/2021    CREATININE 1.17 03/12/2021     03/12/2021    K 4.2 03/12/2021     03/12/2021    CO2 24.0 03/12/2021     Lab Results   Component Value Date    HGBA1C 6.30 (H) 03/12/2021     Lab Results   Component Value Date    WBC 6.63 03/12/2021    HGB 13.1 03/12/2021    HCT 39.7 03/12/2021     03/12/2021       Assessment:   Diagnosis Plan   1. Angina pectoris (CMS/[facility])         Plan:    1.  Stable angina  -Heart catheterization with single CAD, RCA  with good left to right collaterals  -On ASA, fenofibrate, metoprolol 25mg BID, Crestor, Omega-3 1000mg BID (vascepa was denied by insurance)  -Currently no angina     2.  Hypertension  -currently controlled on losartan and metoprolol     3.  Hyperlipidemia  -Total cholesterol 96, LDL 35, triglycerides 134  - On fenofibrate and crestor 10mg  -Omega-3 1000mg BID (vascepa was denied by insurance)    4.   Borderline DM  -following up with Dr. De Guzman    We will see back in 6 months, sooner as needed      Rocco Tabares MD [facility]

## 2021-09-22 ENCOUNTER — PATIENT MESSAGE (OUTPATIENT)
Dept: CARDIOLOGY | Facility: CLINIC | Age: 56
End: 2021-09-22

## 2021-10-11 NOTE — PROGRESS NOTES
[facility] Cardiology  Follow Up Visit  Chris De Guzman  1965    VISIT DATE:  10/12/21    PCP:   Marge Antony, APRN  [address] [place] [facility] [address] [address]          CC:  Angina pectoris (CMS/HCC      Problem List:  1. Coronary artery disease              -Left heart catheterization March 2021:  of the RCA with well-developed left-to-right,                collaterals, no significant LAD or circumflex disease, EF 55%              -Echo EF 51-55%, no significant valvular abnormalities  2. Hyperlipidemia/hypertriglyceridemia  3.  Hypertension     History of Present Illness:  Chris De Guzman  Is a 56 y.o. male with pertinent cardiac history detailed above.  He will have some occasional mild angina symptoms if he is shuttling groceries.  He states symptoms leave within two minutes.  He does not have to rest to make it stop, but it does improve with rest.  No rest symptoms.  He has not needed any SL nitro, never felt anything severe enough to try one.  No shortness of breath.  His other risk factors appear well-controlled blood pressure is doing well.  Lipids and triglycerides are doing well.  Triglycerides are much improved compared with this time last year.  Other issue is pain and arthritis in his hands worse at the end of the day.      Patient Active Problem List    Diagnosis Date Noted   • Angina pectoris (HCC) 03/11/2021     Note Last Updated: 3/11/2021     Added automatically from request for surgery [id number]         No Known Allergies    Social History     Socioeconomic History   • Marital status:    Tobacco Use   • Smoking status: Never Smoker   • Smokeless tobacco: Former User     Types: Chew   Substance and Sexual Activity   • Alcohol use: Not Currently     Alcohol/week: 0.0 standard drinks   • Drug use: Never   • Sexual activity: Yes     Partners: Female       Family History   Problem Relation Age of Onset   • Gallbladder disease Mother    • Liver cancer Father   "  • Hypertension Sister    • Hypertension Brother    • Diabetes type II Brother        Current Medications:    Current Outpatient Medications:   •  acetaminophen (TYLENOL) 325 MG tablet, Take 650 mg by mouth Every 6 (Six) Hours As Needed for Mild Pain ., Disp: , Rfl:   •  aspirin 81 MG EC tablet, Take 1 tablet by mouth Daily., Disp: 30 tablet, Rfl: 6  •  esomeprazole (nexIUM) 40 MG capsule, Take 40 mg by mouth Every Morning Before Breakfast., Disp: , Rfl:   •  fenofibrate 160 MG tablet, Take 160 mg by mouth Daily., Disp: , Rfl:   •  losartan (COZAAR) 25 MG tablet, Take 25 mg by mouth Daily., Disp: , Rfl:   •  metoprolol tartrate (LOPRESSOR) 25 MG tablet, Take 1 tablet by mouth 2 (Two) Times a Day., Disp: 180 tablet, Rfl: 1  •  nitroglycerin (Nitrostat) 0.4 MG SL tablet, Place 1 tablet under the tongue Every 5 (Five) Minutes As Needed for Chest Pain. Take no more than 3 doses in 15 minutes., Disp: 30 tablet, Rfl: 6  •  Omega-3 Fatty Acids (fish oil) 1000 MG capsule capsule, Take 1,000 mg by mouth 2 (Two) Times a Day With Meals., Disp: , Rfl:   •  rosuvastatin (CRESTOR) 10 MG tablet, Take 1 tablet by mouth Daily., Disp: 30 tablet, Rfl: 6  •  vitamin D3 125 MCG (5000 UT) capsule capsule, Take 5,000 Units by mouth Daily., Disp: , Rfl:   •  isosorbide mononitrate (IMDUR) 30 MG 24 hr tablet, Take 1 tablet by mouth Daily., Disp: 30 tablet, Rfl: 11     Review of Systems   Constitutional: Negative for diaphoresis.   Cardiovascular: Positive for chest pain. Negative for dyspnea on exertion, irregular heartbeat, near-syncope, orthopnea, palpitations and syncope.   Musculoskeletal: Positive for arthritis.       Vitals:    10/12/21 1039   BP: 118/72   BP Location: Right arm   Patient Position: Sitting   Pulse: 74   SpO2: 96%   Weight: 109 kg (240 lb 6.4 oz)   Height: 188 cm (74\")       Physical Exam  Constitutional:       Appearance: Normal appearance.   Neck:      Vascular: No carotid bruit.   Cardiovascular:      Rate and " Rhythm: Normal rate and regular rhythm.      Pulses: Normal pulses.      Heart sounds: Normal heart sounds.   Pulmonary:      Effort: Pulmonary effort is normal.      Breath sounds: Normal breath sounds.   Musculoskeletal:      Right lower leg: No edema.      Left lower leg: No edema.   Skin:     General: Skin is warm and dry.   Neurological:      General: No focal deficit present.      Mental Status: He is alert.         Diagnostic Data:  Procedures  Lab Results   Component Value Date    TRIG 134 03/12/2021    HDL 37 (L) 03/12/2021     Lab Results   Component Value Date    GLUCOSE 127 (H) 03/12/2021    BUN 22 (H) 03/12/2021    CREATININE 1.17 03/12/2021     03/12/2021    K 4.2 03/12/2021     03/12/2021    CO2 24.0 03/12/2021     Lab Results   Component Value Date    HGBA1C 6.30 (H) 03/12/2021     Lab Results   Component Value Date    WBC 6.63 03/12/2021    HGB 13.1 03/12/2021    HCT 39.7 03/12/2021     03/12/2021       Assessment:  No diagnosis found.    Plan:    1.  Stable angina  -Heart catheterization RCA  with good left to right collaterals  -On ASA, fenofibrate, metoprolol 25mg BID, Crestor, Omega-3 1000mg BID (vascepa was denied by insurance)  -Has some mild exertional angina symptoms.  Had Imdur 30 mg for hopeful symptomatic improvement.  Will titrate as needed/able  -Would not recommend attempt at RCA revascularization at this time      2.  Hypertension  -currently controlled on losartan and metoprolol     3.  Hyperlipidemia  -Total cholesterol  97, LDL 27, HDL 35, triglycerides 177  - On fenofibrate and crestor 10mg  -Omega-3 1000mg BID (vascepa was denied by insurance)    Follow-up in the spring      Rocco Tabares MD [facility]

## 2021-10-12 ENCOUNTER — OFFICE VISIT (OUTPATIENT)
Dept: CARDIOLOGY | Facility: CLINIC | Age: 56
End: 2021-10-12

## 2021-10-12 VITALS
SYSTOLIC BLOOD PRESSURE: 118 MMHG | DIASTOLIC BLOOD PRESSURE: 72 MMHG | HEIGHT: 74 IN | OXYGEN SATURATION: 96 % | WEIGHT: 240.4 LBS | HEART RATE: 74 BPM | BODY MASS INDEX: 30.85 KG/M2

## 2021-10-12 DIAGNOSIS — I20.9 ANGINA PECTORIS (HCC): Primary | ICD-10-CM

## 2021-10-12 PROCEDURE — 99214 OFFICE O/P EST MOD 30 MIN: CPT | Performed by: INTERNAL MEDICINE

## 2021-10-12 RX ORDER — ISOSORBIDE MONONITRATE 30 MG/1
30 TABLET, EXTENDED RELEASE ORAL DAILY
Qty: 30 TABLET | Refills: 11 | Status: SHIPPED | OUTPATIENT
Start: 2021-10-12 | End: 2021-11-02 | Stop reason: SDUPTHER

## 2021-10-12 RX ORDER — ACETAMINOPHEN 325 MG/1
650 TABLET ORAL EVERY 6 HOURS PRN
COMMUNITY

## 2021-11-02 ENCOUNTER — TELEPHONE (OUTPATIENT)
Dept: CARDIOLOGY | Facility: CLINIC | Age: 56
End: 2021-11-02

## 2021-11-02 RX ORDER — ISOSORBIDE MONONITRATE 60 MG/1
60 TABLET, EXTENDED RELEASE ORAL DAILY
Qty: 90 TABLET | Refills: 3 | Status: SHIPPED | OUTPATIENT
Start: 2021-11-02 | End: 2022-03-15 | Stop reason: SDUPTHER

## 2021-11-02 NOTE — TELEPHONE ENCOUNTER
----- Message from Chris De Guzman sent at 11/1/2021 11:09 PM EDT -----  Regarding: Isosorbide script   Since I’ve doubled dosage, I’m almost out of the isosorbide. Can you call in an update for 2/day and a 90 day supply?    Thanks.

## 2022-02-04 PROBLEM — I25.119 CORONARY ARTERY DISEASE INVOLVING NATIVE CORONARY ARTERY OF NATIVE HEART WITH ANGINA PECTORIS (HCC): Status: ACTIVE | Noted: 2022-02-04

## 2022-03-11 NOTE — PROGRESS NOTES
[facility] Cardiology  Follow Up Visit  Chris De Guzman  1965    VISIT DATE:  03/15/22    PCP:   Marge Antony, APRN  [address] [place] [address]          CC:  Chest Pain      Problem List:  1. Coronary artery disease              -Left heart catheterization March 2021:  of the RCA  with well-developed  left-to-right,                collaterals, no significant LAD or circumflex disease, EF  55%              -Echo EF 51-55%, no significant valvular abnormalities  2. Hyperlipidemia/hypertriglyceridemia  3.  Hypertension      History of Present Illness:  Chris De Guzman  Is a 57 y.o. male with pertinent cardiac history detailed above.  At last visit 30 mg of Imdur was added for stable angina.  Patient still gets intermittent chest and shoulder pains with exertion.  Paradoxically it seems to be worse with mild exertion as opposed to heavy exertion.  He feels like the isosorbide is helpful somewhat but tends to wear off at the end of the day.  His other cardiac risk factors are doing very well currently.  Blood pressure and lipids are good      Patient Active Problem List    Diagnosis Date Noted   • Coronary artery disease involving native coronary artery of native heart with angina pectoris (HCC) 02/04/2022   • Angina pectoris (HCC) 03/11/2021     Note Last Updated: 3/11/2021     Added automatically from request for surgery [id number]         No Known Allergies    Social History     Socioeconomic History   • Marital status:    Tobacco Use   • Smoking status: Never Smoker   • Smokeless tobacco: Former User     Types: Chew   Substance and Sexual Activity   • Alcohol use: Not Currently     Alcohol/week: 0.0 standard drinks   • Drug use: Never   • Sexual activity: Yes     Partners: Female       Family History   Problem Relation Age of Onset   • Gallbladder disease Mother    • Liver cancer Father    • Hypertension Sister    • Hypertension Brother    • Diabetes type II  "Brother        Current Medications:    Current Outpatient Medications:   •  acetaminophen (TYLENOL) 325 MG tablet, Take 650 mg by mouth Every 6 (Six) Hours As Needed for Mild Pain ., Disp: , Rfl:   •  aspirin 81 MG EC tablet, Take 1 tablet by mouth Daily., Disp: 30 tablet, Rfl: 6  •  cyanocobalamin (VITAMIN B-12) 250 MCG tablet, Take 500 mcg by mouth Daily., Disp: , Rfl:   •  esomeprazole (nexIUM) 40 MG capsule, Take 40 mg by mouth Every Morning Before Breakfast., Disp: , Rfl:   •  fenofibrate 160 MG tablet, Take 160 mg by mouth Daily., Disp: , Rfl:   •  isosorbide mononitrate (IMDUR) 60 MG 24 hr tablet, Take 1 tablet by mouth Daily., Disp: 90 tablet, Rfl: 3  •  losartan (COZAAR) 25 MG tablet, Take 25 mg by mouth Daily., Disp: , Rfl:   •  metoprolol tartrate (LOPRESSOR) 25 MG tablet, Take 1 tablet by mouth 2 (Two) Times a Day., Disp: 180 tablet, Rfl: 1  •  nitroglycerin (Nitrostat) 0.4 MG SL tablet, Place 1 tablet under the tongue Every 5 (Five) Minutes As Needed for Chest Pain. Take no more than 3 doses in 15 minutes., Disp: 30 tablet, Rfl: 6  •  Omega-3 Fatty Acids (fish oil) 1000 MG capsule capsule, Take 1,000 mg by mouth 2 (Two) Times a Day With Meals., Disp: , Rfl:   •  rosuvastatin (CRESTOR) 10 MG tablet, Take 1 tablet by mouth Daily., Disp: 30 tablet, Rfl: 6  •  vitamin D3 125 MCG (5000 UT) capsule capsule, Take 5,000 Units by mouth Daily., Disp: , Rfl:      Review of Systems   Cardiovascular: Positive for chest pain. Negative for dyspnea on exertion, irregular heartbeat, leg swelling, near-syncope, palpitations and syncope.       Vitals:    03/15/22 0947   BP: 122/76   BP Location: Right arm   Patient Position: Sitting   Pulse: 64   SpO2: 99%   Weight: 112 kg (246 lb 9.6 oz)   Height: 185.4 cm (73\")       Physical Exam  Constitutional:       Appearance: Normal appearance.   Neck:      Vascular: No carotid bruit.   Cardiovascular:      Rate and Rhythm: Normal rate and regular rhythm.      Pulses: Normal " pulses.      Heart sounds: Normal heart sounds.   Pulmonary:      Effort: Pulmonary effort is normal.      Breath sounds: Normal breath sounds.   Abdominal:      Palpations: Abdomen is soft.   Musculoskeletal:      Right lower leg: No edema.      Left lower leg: No edema.   Skin:     General: Skin is warm and dry.   Neurological:      General: No focal deficit present.      Mental Status: He is alert.         Diagnostic Data:  Procedures  Lab Results   Component Value Date    TRIG 134 03/12/2021    HDL 37 (L) 03/12/2021     Lab Results   Component Value Date    GLUCOSE 127 (H) 03/12/2021    BUN 22 (H) 03/12/2021    CREATININE 1.17 03/12/2021     03/12/2021    K 4.2 03/12/2021     03/12/2021    CO2 24.0 03/12/2021     Lab Results   Component Value Date    HGBA1C 6.30 (H) 03/12/2021     Lab Results   Component Value Date    WBC 6.63 03/12/2021    HGB 13.1 03/12/2021    HCT 39.7 03/12/2021     03/12/2021       Assessment:  No diagnosis found.    Plan:      1.  Stable angina, class II symptoms on multiple antianginals  -Heart catheterization RCA  with good left to right collaterals  -On ASA, fenofibrate, metoprolol 25mg BID, Crestor, Omega-3 1000mg BID (vascepa was denied by insurance)  -Imdur added last visit 60mg, will increase to BID  -I think it would be reasonable to explore options for  intervention.  I reviewed the films with Dr. Matthew and since the occlusion is relatively short segment with good collaterals establish feel this would be a good candidate for  intervention  -Discussed the plan with the patient and he is agreeable to proceed      2.  Hypertension  -currently controlled on losartan and metoprolol     3.  Hyperlipidemia  -Total cholesterol  110, LDL39, HDL 43, triglycerides 147  - On fenofibrate and crestor 10mg  -Omega-3 1000mg BID (vascepa was denied by insurance)    Follow-up post procedure    Rocco Tabares MD [facility]

## 2022-03-14 NOTE — TELEPHONE ENCOUNTER
Medication Refill Request    Medication: metoprolol tartrate (LOPRESSOR) 25 MG tablet BID    Pertinent Labs:  Lab Results   Component Value Date    GLUCOSE 127 (H) 03/12/2021    BUN 22 (H) 03/12/2021    CREATININE 1.17 03/12/2021    EGFRIFNONA 65 03/12/2021    BCR 18.8 03/12/2021    K 4.2 03/12/2021    CO2 24.0 03/12/2021    CALCIUM 9.2 03/12/2021    ALBUMIN 4.30 03/12/2021    ALKPHOS 68 03/12/2021    AST 25 03/12/2021    ALT 38 03/12/2021      Lab Results   Component Value Date    CHOL 96 03/12/2021    TRIG 134 03/12/2021    HDL 37 (L) 03/12/2021    LDL 35 03/12/2021     Lab Results   Component Value Date    HGBA1C 6.30 (H) 03/12/2021     Lab Results   Component Value Date    WBC 6.63 03/12/2021    HGB 13.1 03/12/2021    HCT 39.7 03/12/2021    MCV 86.7 03/12/2021     03/12/2021     No results found for: TSH     details… detailed exam ROM intact/no joint swelling/no joint erythema

## 2022-03-15 ENCOUNTER — OFFICE VISIT (OUTPATIENT)
Dept: CARDIOLOGY | Facility: CLINIC | Age: 57
End: 2022-03-15

## 2022-03-15 ENCOUNTER — TELEPHONE (OUTPATIENT)
Dept: CARDIOLOGY | Facility: CLINIC | Age: 57
End: 2022-03-15

## 2022-03-15 ENCOUNTER — PREP FOR SURGERY (OUTPATIENT)
Dept: OTHER | Facility: HOSPITAL | Age: 57
End: 2022-03-15

## 2022-03-15 VITALS
DIASTOLIC BLOOD PRESSURE: 76 MMHG | HEIGHT: 73 IN | OXYGEN SATURATION: 99 % | WEIGHT: 246.6 LBS | SYSTOLIC BLOOD PRESSURE: 122 MMHG | BODY MASS INDEX: 32.68 KG/M2 | HEART RATE: 64 BPM

## 2022-03-15 DIAGNOSIS — I25.119 CORONARY ARTERY DISEASE INVOLVING NATIVE CORONARY ARTERY OF NATIVE HEART WITH ANGINA PECTORIS: Primary | ICD-10-CM

## 2022-03-15 DIAGNOSIS — E78.5 HYPERLIPIDEMIA LDL GOAL <70: ICD-10-CM

## 2022-03-15 PROBLEM — I20.9 ANGINA PECTORIS (HCC): Status: RESOLVED | Noted: 2021-03-11 | Resolved: 2022-03-15

## 2022-03-15 PROCEDURE — 99214 OFFICE O/P EST MOD 30 MIN: CPT | Performed by: INTERNAL MEDICINE

## 2022-03-15 RX ORDER — ASPIRIN 81 MG/1
81 TABLET ORAL DAILY
Status: CANCELLED | OUTPATIENT
Start: 2022-03-16

## 2022-03-15 RX ORDER — CYANOCOBALAMIN (VITAMIN B-12) 250 MCG
500 TABLET ORAL DAILY
COMMUNITY
End: 2022-09-20

## 2022-03-15 RX ORDER — ISOSORBIDE MONONITRATE 60 MG/1
60 TABLET, EXTENDED RELEASE ORAL 2 TIMES DAILY
Qty: 90 TABLET | Refills: 3 | Status: SHIPPED | OUTPATIENT
Start: 2022-03-15 | End: 2022-03-15 | Stop reason: SDUPTHER

## 2022-03-15 RX ORDER — SODIUM CHLORIDE 0.9 % (FLUSH) 0.9 %
10 SYRINGE (ML) INJECTION AS NEEDED
Status: CANCELLED | OUTPATIENT
Start: 2022-03-15

## 2022-03-15 RX ORDER — ISOSORBIDE MONONITRATE 60 MG/1
60 TABLET, EXTENDED RELEASE ORAL 2 TIMES DAILY
Qty: 90 TABLET | Refills: 3
Start: 2022-03-15

## 2022-03-15 RX ORDER — SODIUM CHLORIDE 0.9 % (FLUSH) 0.9 %
10 SYRINGE (ML) INJECTION EVERY 12 HOURS SCHEDULED
Status: CANCELLED | OUTPATIENT
Start: 2022-03-15

## 2022-03-15 RX ORDER — ASPIRIN 81 MG/1
324 TABLET, CHEWABLE ORAL ONCE
Status: CANCELLED | OUTPATIENT
Start: 2022-03-15 | End: 2022-03-15

## 2022-04-01 ENCOUNTER — PREP FOR SURGERY (OUTPATIENT)
Dept: OTHER | Facility: HOSPITAL | Age: 57
End: 2022-04-01

## 2022-04-18 ENCOUNTER — APPOINTMENT (OUTPATIENT)
Dept: CARDIOLOGY | Facility: HOSPITAL | Age: 57
End: 2022-04-18

## 2022-04-18 ENCOUNTER — HOSPITAL ENCOUNTER (OUTPATIENT)
Facility: HOSPITAL | Age: 57
Discharge: HOME | End: 2022-04-19
Attending: INTERNAL MEDICINE | Admitting: INTERNAL MEDICINE

## 2022-04-18 DIAGNOSIS — E78.5 HYPERLIPIDEMIA LDL GOAL <70: ICD-10-CM

## 2022-04-18 DIAGNOSIS — I25.119 CORONARY ARTERY DISEASE INVOLVING NATIVE CORONARY ARTERY OF NATIVE HEART WITH ANGINA PECTORIS: ICD-10-CM

## 2022-04-18 LAB
ACT BLD: 267 SECONDS (ref 82–152)
ACT BLD: 291 SECONDS (ref 82–152)
ACT BLD: 434 SECONDS (ref 82–152)
BASOPHILS # BLD AUTO: 0.04 10*3/MM3 (ref 0–0.2)
BASOPHILS NFR BLD AUTO: 0.6 % (ref 0–1.5)
BH CV ECHO MEAS - BSA(HAYCOCK): 2.5 M^2
BH CV ECHO MEAS - BSA: 2.4 M^2
BH CV ECHO MEAS - BZI_BMI: 31.8 KILOGRAMS/M^2
BH CV ECHO MEAS - BZI_METRIC_HEIGHT: 188 CM
BH CV ECHO MEAS - BZI_METRIC_WEIGHT: 112.5 KG
BH CV ECHO MEAS - EDV(MOD-SP4): 142 ML
BH CV ECHO MEAS - EF(MOD-SP4): 59.2 %
BH CV ECHO MEAS - ESV(MOD-SP4): 58 ML
BH CV ECHO MEAS - LV DIASTOLIC VOL/BSA (35-75): 59.6 ML/M^2
BH CV ECHO MEAS - LV SYSTOLIC VOL/BSA (12-30): 24.4 ML/M^2
BH CV ECHO MEAS - LVLD AP4: 10.5 CM
BH CV ECHO MEAS - LVLS AP4: 9.3 CM
BH CV ECHO MEAS - SI(MOD-SP4): 35.3 ML/M^2
BH CV ECHO MEAS - SV(MOD-SP4): 84 ML
BH CV ECHO MEAS - TAPSE (>1.6): 2.1 CM
BH CV VAS BP LEFT ARM: NORMAL MMHG
BH CV XLRA - RV BASE: 3.3 CM
BH CV XLRA - RV LENGTH: 8.6 CM
BH CV XLRA - RV MID: 2.2 CM
CHOLEST SERPL-MCNC: 107 MG/DL (ref 0–200)
CREAT BLDA-MCNC: 1.1 MG/DL (ref 0.6–1.3)
DEPRECATED RDW RBC AUTO: 41.7 FL (ref 37–54)
EOSINOPHIL # BLD AUTO: 0.17 10*3/MM3 (ref 0–0.4)
EOSINOPHIL NFR BLD AUTO: 2.5 % (ref 0.3–6.2)
ERYTHROCYTE [DISTWIDTH] IN BLOOD BY AUTOMATED COUNT: 13.2 % (ref 12.3–15.4)
HCT VFR BLD AUTO: 42.4 % (ref 37.5–51)
HDLC SERPL-MCNC: 37 MG/DL (ref 40–60)
HGB BLD-MCNC: 14.3 G/DL (ref 13–17.7)
IMM GRANULOCYTES # BLD AUTO: 0.03 10*3/MM3 (ref 0–0.05)
IMM GRANULOCYTES NFR BLD AUTO: 0.4 % (ref 0–0.5)
LDLC SERPL CALC-MCNC: 35 MG/DL (ref 0–100)
LDLC/HDLC SERPL: 0.69 {RATIO}
LV EF 2D ECHO EST: 55 %
LYMPHOCYTES # BLD AUTO: 1.83 10*3/MM3 (ref 0.7–3.1)
LYMPHOCYTES NFR BLD AUTO: 27 % (ref 19.6–45.3)
MCH RBC QN AUTO: 29.4 PG (ref 26.6–33)
MCHC RBC AUTO-ENTMCNC: 33.7 G/DL (ref 31.5–35.7)
MCV RBC AUTO: 87.1 FL (ref 79–97)
MONOCYTES # BLD AUTO: 0.65 10*3/MM3 (ref 0.1–0.9)
MONOCYTES NFR BLD AUTO: 9.6 % (ref 5–12)
NEUTROPHILS NFR BLD AUTO: 4.05 10*3/MM3 (ref 1.7–7)
NEUTROPHILS NFR BLD AUTO: 59.9 % (ref 42.7–76)
NRBC BLD AUTO-RTO: 0 /100 WBC (ref 0–0.2)
PLATELET # BLD AUTO: 207 10*3/MM3 (ref 140–450)
PMV BLD AUTO: 9.7 FL (ref 6–12)
RBC # BLD AUTO: 4.87 10*6/MM3 (ref 4.14–5.8)
TRIGL SERPL-MCNC: 223 MG/DL (ref 0–150)
VLDLC SERPL-MCNC: 35 MG/DL (ref 5–40)
WBC NRBC COR # BLD: 6.77 10*3/MM3 (ref 3.4–10.8)

## 2022-04-18 PROCEDURE — C1887 CATHETER, GUIDING: HCPCS | Performed by: INTERNAL MEDICINE

## 2022-04-18 PROCEDURE — 25010000002 MIDAZOLAM PER 1 MG: Performed by: INTERNAL MEDICINE

## 2022-04-18 PROCEDURE — 25010000002 HYDRALAZINE PER 20 MG: Performed by: INTERNAL MEDICINE

## 2022-04-18 PROCEDURE — C1894 INTRO/SHEATH, NON-LASER: HCPCS | Performed by: INTERNAL MEDICINE

## 2022-04-18 PROCEDURE — 80061 LIPID PANEL: CPT | Performed by: INTERNAL MEDICINE

## 2022-04-18 PROCEDURE — G0378 HOSPITAL OBSERVATION PER HR: HCPCS

## 2022-04-18 PROCEDURE — C1760 CLOSURE DEV, VASC: HCPCS | Performed by: INTERNAL MEDICINE

## 2022-04-18 PROCEDURE — 93325 DOPPLER ECHO COLOR FLOW MAPG: CPT

## 2022-04-18 PROCEDURE — 85347 COAGULATION TIME ACTIVATED: CPT

## 2022-04-18 PROCEDURE — 85025 COMPLETE CBC W/AUTO DIFF WBC: CPT | Performed by: INTERNAL MEDICINE

## 2022-04-18 PROCEDURE — C1769 GUIDE WIRE: HCPCS | Performed by: INTERNAL MEDICINE

## 2022-04-18 PROCEDURE — 92943 PRQ TRLUML REVSC CH OCC ANT: CPT | Performed by: INTERNAL MEDICINE

## 2022-04-18 PROCEDURE — 93005 ELECTROCARDIOGRAM TRACING: CPT | Performed by: INTERNAL MEDICINE

## 2022-04-18 PROCEDURE — 0 IOPAMIDOL PER 1 ML: Performed by: INTERNAL MEDICINE

## 2022-04-18 PROCEDURE — 25010000002 FENTANYL CITRATE (PF) 50 MCG/ML SOLUTION: Performed by: INTERNAL MEDICINE

## 2022-04-18 PROCEDURE — 93325 DOPPLER ECHO COLOR FLOW MAPG: CPT | Performed by: INTERNAL MEDICINE

## 2022-04-18 PROCEDURE — 93010 ELECTROCARDIOGRAM REPORT: CPT | Performed by: INTERNAL MEDICINE

## 2022-04-18 PROCEDURE — 93321 DOPPLER ECHO F-UP/LMTD STD: CPT | Performed by: INTERNAL MEDICINE

## 2022-04-18 PROCEDURE — 93321 DOPPLER ECHO F-UP/LMTD STD: CPT

## 2022-04-18 PROCEDURE — S0260 H&P FOR SURGERY: HCPCS | Performed by: NURSE PRACTITIONER

## 2022-04-18 PROCEDURE — 93308 TTE F-UP OR LMTD: CPT | Performed by: INTERNAL MEDICINE

## 2022-04-18 PROCEDURE — 93308 TTE F-UP OR LMTD: CPT

## 2022-04-18 PROCEDURE — 25010000002 HEPARIN (PORCINE) PER 1000 UNITS: Performed by: INTERNAL MEDICINE

## 2022-04-18 PROCEDURE — 25010000002 ONDANSETRON PER 1 MG: Performed by: INTERNAL MEDICINE

## 2022-04-18 PROCEDURE — 82565 ASSAY OF CREATININE: CPT

## 2022-04-18 PROCEDURE — 93454 CORONARY ARTERY ANGIO S&I: CPT | Performed by: INTERNAL MEDICINE

## 2022-04-18 RX ORDER — CLOPIDOGREL BISULFATE 75 MG/1
600 TABLET ORAL DAILY
Status: DISCONTINUED | OUTPATIENT
Start: 2022-04-18 | End: 2022-04-19

## 2022-04-18 RX ORDER — SODIUM CHLORIDE 0.9 % (FLUSH) 0.9 %
10 SYRINGE (ML) INJECTION EVERY 12 HOURS SCHEDULED
Status: DISCONTINUED | OUTPATIENT
Start: 2022-04-18 | End: 2022-04-18 | Stop reason: HOSPADM

## 2022-04-18 RX ORDER — FENTANYL CITRATE 50 UG/ML
INJECTION, SOLUTION INTRAMUSCULAR; INTRAVENOUS AS NEEDED
Status: DISCONTINUED | OUTPATIENT
Start: 2022-04-18 | End: 2022-04-18 | Stop reason: HOSPADM

## 2022-04-18 RX ORDER — LIDOCAINE HYDROCHLORIDE 10 MG/ML
INJECTION, SOLUTION EPIDURAL; INFILTRATION; INTRACAUDAL; PERINEURAL AS NEEDED
Status: DISCONTINUED | OUTPATIENT
Start: 2022-04-18 | End: 2022-04-18 | Stop reason: HOSPADM

## 2022-04-18 RX ORDER — SODIUM CHLORIDE 9 MG/ML
5 INJECTION, SOLUTION INTRAVENOUS CONTINUOUS
Status: ACTIVE | OUTPATIENT
Start: 2022-04-18 | End: 2022-04-18

## 2022-04-18 RX ORDER — MIDAZOLAM HYDROCHLORIDE 1 MG/ML
INJECTION INTRAMUSCULAR; INTRAVENOUS AS NEEDED
Status: DISCONTINUED | OUTPATIENT
Start: 2022-04-18 | End: 2022-04-18 | Stop reason: HOSPADM

## 2022-04-18 RX ORDER — HYDRALAZINE HYDROCHLORIDE 25 MG/1
25 TABLET, FILM COATED ORAL ONCE
Status: COMPLETED | OUTPATIENT
Start: 2022-04-18 | End: 2022-04-18

## 2022-04-18 RX ORDER — HYDRALAZINE HYDROCHLORIDE 20 MG/ML
20 INJECTION INTRAMUSCULAR; INTRAVENOUS ONCE
Status: COMPLETED | OUTPATIENT
Start: 2022-04-18 | End: 2022-04-18

## 2022-04-18 RX ORDER — ONDANSETRON 2 MG/ML
4 INJECTION INTRAMUSCULAR; INTRAVENOUS EVERY 6 HOURS PRN
Status: DISCONTINUED | OUTPATIENT
Start: 2022-04-18 | End: 2022-04-19 | Stop reason: HOSPADM

## 2022-04-18 RX ORDER — ASPIRIN 81 MG/1
324 TABLET, CHEWABLE ORAL ONCE
Status: COMPLETED | OUTPATIENT
Start: 2022-04-18 | End: 2022-04-18

## 2022-04-18 RX ORDER — ASPIRIN 81 MG/1
81 TABLET ORAL DAILY
Status: DISCONTINUED | OUTPATIENT
Start: 2022-04-19 | End: 2022-04-18 | Stop reason: HOSPADM

## 2022-04-18 RX ORDER — ISOSORBIDE MONONITRATE 60 MG/1
60 TABLET, EXTENDED RELEASE ORAL
Status: DISCONTINUED | OUTPATIENT
Start: 2022-04-19 | End: 2022-04-19 | Stop reason: HOSPADM

## 2022-04-18 RX ORDER — ROSUVASTATIN CALCIUM 10 MG/1
10 TABLET, COATED ORAL NIGHTLY
Status: DISCONTINUED | OUTPATIENT
Start: 2022-04-18 | End: 2022-04-19 | Stop reason: HOSPADM

## 2022-04-18 RX ORDER — NITROGLYCERIN 0.4 MG/1
TABLET SUBLINGUAL
Status: COMPLETED
Start: 2022-04-18 | End: 2022-04-18

## 2022-04-18 RX ORDER — FENOFIBRATE 145 MG/1
145 TABLET, COATED ORAL DAILY
Status: DISCONTINUED | OUTPATIENT
Start: 2022-04-19 | End: 2022-04-19 | Stop reason: HOSPADM

## 2022-04-18 RX ORDER — ASPIRIN 81 MG/1
81 TABLET ORAL DAILY
Status: DISCONTINUED | OUTPATIENT
Start: 2022-04-19 | End: 2022-04-19 | Stop reason: HOSPADM

## 2022-04-18 RX ORDER — LOSARTAN POTASSIUM 25 MG/1
25 TABLET ORAL DAILY
Status: DISCONTINUED | OUTPATIENT
Start: 2022-04-19 | End: 2022-04-19 | Stop reason: HOSPADM

## 2022-04-18 RX ORDER — NITROGLYCERIN 0.4 MG/1
0.4 TABLET SUBLINGUAL
Status: DISCONTINUED | OUTPATIENT
Start: 2022-04-18 | End: 2022-04-19 | Stop reason: HOSPADM

## 2022-04-18 RX ORDER — ISOSORBIDE MONONITRATE 20 MG/1
60 TABLET ORAL ONCE
Status: DISCONTINUED | OUTPATIENT
Start: 2022-04-18 | End: 2022-04-18 | Stop reason: ALTCHOICE

## 2022-04-18 RX ORDER — SODIUM CHLORIDE 0.9 % (FLUSH) 0.9 %
10 SYRINGE (ML) INJECTION AS NEEDED
Status: DISCONTINUED | OUTPATIENT
Start: 2022-04-18 | End: 2022-04-18 | Stop reason: HOSPADM

## 2022-04-18 RX ORDER — HYDRALAZINE HYDROCHLORIDE 25 MG/1
25 TABLET, FILM COATED ORAL EVERY 8 HOURS SCHEDULED
Status: DISCONTINUED | OUTPATIENT
Start: 2022-04-18 | End: 2022-04-18

## 2022-04-18 RX ORDER — HEPARIN SODIUM 1000 [USP'U]/ML
INJECTION, SOLUTION INTRAVENOUS; SUBCUTANEOUS AS NEEDED
Status: DISCONTINUED | OUTPATIENT
Start: 2022-04-18 | End: 2022-04-18 | Stop reason: HOSPADM

## 2022-04-18 RX ORDER — ACETAMINOPHEN 325 MG/1
650 TABLET ORAL EVERY 6 HOURS PRN
Status: DISCONTINUED | OUTPATIENT
Start: 2022-04-18 | End: 2022-04-19 | Stop reason: HOSPADM

## 2022-04-18 RX ORDER — ISOSORBIDE MONONITRATE 60 MG/1
60 TABLET, EXTENDED RELEASE ORAL ONCE
Status: COMPLETED | OUTPATIENT
Start: 2022-04-18 | End: 2022-04-18

## 2022-04-18 RX ADMIN — ONDANSETRON 4 MG: 2 INJECTION INTRAMUSCULAR; INTRAVENOUS at 19:04

## 2022-04-18 RX ADMIN — NITROGLYCERIN: 0.4 TABLET SUBLINGUAL at 17:32

## 2022-04-18 RX ADMIN — ASPIRIN 81 MG 324 MG: 81 TABLET ORAL at 08:47

## 2022-04-18 RX ADMIN — CLOPIDOGREL BISULFATE 600 MG: 75 TABLET ORAL at 09:37

## 2022-04-18 RX ADMIN — HYDRALAZINE HYDROCHLORIDE 25 MG: 25 TABLET, FILM COATED ORAL at 19:33

## 2022-04-18 RX ADMIN — METOPROLOL TARTRATE 25 MG: 25 TABLET, FILM COATED ORAL at 19:33

## 2022-04-18 RX ADMIN — HYDRALAZINE HYDROCHLORIDE 20 MG: 20 INJECTION INTRAMUSCULAR; INTRAVENOUS at 18:19

## 2022-04-18 RX ADMIN — ROSUVASTATIN CALCIUM 10 MG: 10 TABLET, COATED ORAL at 20:41

## 2022-04-18 RX ADMIN — ISOSORBIDE MONONITRATE 60 MG: 60 TABLET, EXTENDED RELEASE ORAL at 17:45

## 2022-04-18 NOTE — H&P
Pre-Cardiac Catheterization Report  Cardiovascular Laboratory        Patient:  Chris De Guzman  :  1965  PCP:  Marge Antony APRN  PHONE:  [phone]    DATE: 2022    BRIEF HPI:  Chris De Guzman is a 57 y.o. male with a history of coronary artery disease and hyperlipidemia who is being referred by Dr. Rocco Tabares to undergo PCI of a chronic total occlusion of the right coronary artery.  Patient underwent cardiac catheterization 1 year ago which showed a chronic occlusion of the right coronary artery with collaterals.  Echocardiogram showed normal LVEF.  Despite increased titration of 2 antianginals the patient has continued to have intermittent exertional chest pain.  His previous cardiac cath pictures were reviewed and given his persistent CCS class II symptoms PCI of the  was recommended.    Cardiac Risk Factors: Male gender, known CAD, hyperlipidemia    Anginal class in last 2 weeks:  CCS class III    CHF Class in last 2 weeks:  NYHA Class II    Cardiogenic shock:  no    Cardiac arrest <24 hours:  no    Stress test within last 6 months:   no   Details:    Previous cardiac catheterization:  yes  Details: Cardiac cath (3/12/2021):    Previous CABG:  no  Details:      Allergies:     IV contrast allergy:  no  No Known Allergies    MEDICATIONS:  Prior to Admission medications    Medication Sig Start Date End Date Taking? Authorizing Provider   acetaminophen (TYLENOL) 325 MG tablet Take 650 mg by mouth Every 6 (Six) Hours As Needed for Mild Pain .   Yes Ike Andino MD   aspirin 81 MG EC tablet Take 1 tablet by mouth Daily. 21  Yes Rocco Tabares MD   cyanocobalamin (VITAMIN B-12) 250 MCG tablet Take 500 mcg by mouth Daily.   Yes Ike Andino MD   esomeprazole (nexIUM) 40 MG capsule Take 40 mg by mouth Every Morning Before Breakfast.   Yes Ike Andino MD   fenofibrate 160 MG tablet Take 160 mg by mouth Daily.   Yes  Ike Andino MD   isosorbide mononitrate (IMDUR) 60 MG 24 hr tablet Take 1 tablet by mouth 2 (Two) Times a Day. 3/15/22  Yes Omer Matthew IV, MD   losartan (COZAAR) 25 MG tablet Take 25 mg by mouth Daily.   Yes Ike Andino MD   metoprolol tartrate (LOPRESSOR) 25 MG tablet Take 1 tablet by mouth 2 (Two) Times a Day. 3/15/22  Yes Omer Matthew IV, MD   nitroglycerin (Nitrostat) 0.4 MG SL tablet Place 1 tablet under the tongue Every 5 (Five) Minutes As Needed for Chest Pain. Take no more than 3 doses in 15 minutes. 12/15/20  Yes Rocco Tabares MD   Omega-3 Fatty Acids (fish oil) 1000 MG capsule capsule Take 1,000 mg by mouth 2 (Two) Times a Day With Meals.   Yes Ike Andino MD   rosuvastatin (CRESTOR) 10 MG tablet Take 1 tablet by mouth Daily. 12/15/20  Yes Rocco Tabares MD   vitamin D3 125 MCG (5000 UT) capsule capsule Take 5,000 Units by mouth Daily.   Yes Ike Andino MD       Past medical & surgical history, social and family history reviewed in the electronic medical record.    ROS:  All systems reviewed were negative except pertinent positives listed in HPI    Physical Exam:    Vitals:   Vitals:    04/18/22 0823   BP: 142/89   Pulse: 73   Resp: 16   SpO2: 94%          04/18/22  0823   Weight: 113 kg (248 lb 9.6 oz)     Constitutional:       Appearance: Healthy appearance. Well-developed.   Eyes:      General: Lids are normal. No scleral icterus.     Conjunctiva/sclera: Conjunctivae normal.   HENT:      Head: Normocephalic and atraumatic.   Neck:      Thyroid: No thyromegaly.      Vascular: No carotid bruit or JVD.   Pulmonary:      Effort: Pulmonary effort is normal.      Breath sounds: Normal breath sounds. No wheezing. No rhonchi. No rales.   Cardiovascular:      Normal rate. Regular rhythm.      Murmurs: There is no murmur.      No gallop. No rub.   Pulses:     Intact distal pulses.   Edema:     Peripheral edema absent.    Abdominal:      General: There is no distension.      Palpations: Abdomen is soft. There is no abdominal mass.   Musculoskeletal:      Cervical back: Normal range of motion. Skin:     General: Skin is warm and dry.      Findings: No rash.   Neurological:      General: No focal deficit present.      Mental Status: Alert and oriented to person, place, and time.      Gait: Gait is intact.   Psychiatric:         Attention and Perception: Attention normal.         Mood and Affect: Mood normal.         Behavior: Behavior normal.                 Lab Results   Component Value Date    TRIG 134 03/12/2021    HDL 37 (L) 03/12/2021    AST 25 03/12/2021    ALT 38 03/12/2021           Impression      Active Hospital Problems    Diagnosis    • **Coronary artery disease involving native coronary artery of native heart with angina pectoris (HCC)      Cardiac catheterization (3/12/2021): Severe one-vessel CAD involving chronic total occlusion of the RCA.  Normal LVEF     • Hyperlipidemia LDL goal <70      High intensity statin therapy indicated given the presence of CAD         Plan   Patient presents today to undergo PCI of a chronic total occlusion of the right coronary artery.  The risks and benefits of the procedure were discussed and the patient is agreeable to proceed.  The patient is not on a P2Y12 inhibitor at home but takes daily 81 mg aspirin.  He has been premedicated with 324 mg aspirin and 600 mg Plavix today.      Proceed with staged PCI of the  of the RCA  Possible retrograde femoral approach  Further recommendations to follow              JENI Vallejo   04/18/22  08:40 EDT

## 2022-04-19 VITALS
SYSTOLIC BLOOD PRESSURE: 122 MMHG | BODY MASS INDEX: 30.49 KG/M2 | HEIGHT: 75 IN | HEART RATE: 80 BPM | DIASTOLIC BLOOD PRESSURE: 87 MMHG | TEMPERATURE: 98.6 F | OXYGEN SATURATION: 94 % | WEIGHT: 245.2 LBS | RESPIRATION RATE: 18 BRPM

## 2022-04-19 LAB
ANION GAP SERPL CALCULATED.3IONS-SCNC: 9 MMOL/L (ref 5–15)
BUN SERPL-MCNC: 12 MG/DL (ref 6–20)
BUN/CREAT SERPL: 12.1 (ref 7–25)
CALCIUM SPEC-SCNC: 9 MG/DL (ref 8.6–10.5)
CHLORIDE SERPL-SCNC: 105 MMOL/L (ref 98–107)
CO2 SERPL-SCNC: 23 MMOL/L (ref 22–29)
CREAT SERPL-MCNC: 0.99 MG/DL (ref 0.76–1.27)
DEPRECATED RDW RBC AUTO: 41.3 FL (ref 37–54)
EGFRCR SERPLBLD CKD-EPI 2021: 88.9 ML/MIN/1.73
ERYTHROCYTE [DISTWIDTH] IN BLOOD BY AUTOMATED COUNT: 13.2 % (ref 12.3–15.4)
GLUCOSE SERPL-MCNC: 109 MG/DL (ref 65–99)
HCT VFR BLD AUTO: 39.6 % (ref 37.5–51)
HGB BLD-MCNC: 13.6 G/DL (ref 13–17.7)
MCH RBC QN AUTO: 29.4 PG (ref 26.6–33)
MCHC RBC AUTO-ENTMCNC: 34.3 G/DL (ref 31.5–35.7)
MCV RBC AUTO: 85.5 FL (ref 79–97)
PLATELET # BLD AUTO: 209 10*3/MM3 (ref 140–450)
PMV BLD AUTO: 9.7 FL (ref 6–12)
POTASSIUM SERPL-SCNC: 3.7 MMOL/L (ref 3.5–5.2)
RBC # BLD AUTO: 4.63 10*6/MM3 (ref 4.14–5.8)
SODIUM SERPL-SCNC: 137 MMOL/L (ref 136–145)
WBC NRBC COR # BLD: 9.93 10*3/MM3 (ref 3.4–10.8)

## 2022-04-19 PROCEDURE — 85027 COMPLETE CBC AUTOMATED: CPT | Performed by: INTERNAL MEDICINE

## 2022-04-19 PROCEDURE — 80048 BASIC METABOLIC PNL TOTAL CA: CPT | Performed by: INTERNAL MEDICINE

## 2022-04-19 PROCEDURE — 99213 OFFICE O/P EST LOW 20 MIN: CPT | Performed by: INTERNAL MEDICINE

## 2022-04-19 RX ORDER — CLOPIDOGREL BISULFATE 75 MG/1
75 TABLET ORAL DAILY
Status: DISCONTINUED | OUTPATIENT
Start: 2022-04-19 | End: 2022-04-19 | Stop reason: HOSPADM

## 2022-04-19 RX ORDER — CLOPIDOGREL BISULFATE 75 MG/1
75 TABLET ORAL DAILY
Qty: 90 TABLET | Refills: 1 | Status: SHIPPED | OUTPATIENT
Start: 2022-04-20 | End: 2022-09-20 | Stop reason: SDDI

## 2022-04-19 RX ADMIN — CLOPIDOGREL BISULFATE 75 MG: 75 TABLET ORAL at 08:14

## 2022-04-19 RX ADMIN — ASPIRIN 81 MG: 81 TABLET, COATED ORAL at 08:14

## 2022-04-19 RX ADMIN — ISOSORBIDE MONONITRATE 60 MG: 60 TABLET, EXTENDED RELEASE ORAL at 08:14

## 2022-04-19 RX ADMIN — LOSARTAN POTASSIUM 25 MG: 25 TABLET, FILM COATED ORAL at 08:14

## 2022-04-19 RX ADMIN — FENOFIBRATE 145 MG: 145 TABLET ORAL at 08:13

## 2022-04-19 RX ADMIN — METOPROLOL TARTRATE 25 MG: 25 TABLET, FILM COATED ORAL at 08:14

## 2022-04-19 NOTE — PLAN OF CARE
Goal Outcome Evaluation:  Plan of Care Reviewed With: patient        Progress: improving    Pt admitted from cath lab this evening for scheduled stent to [facility], did not get any intervention. Pt A&Ox4, VSS, on room air. NSR on monitor. Bilateral groin sites CDI. Chest pressure 3/10 upon arrival to floor, eventually subsided- cardiology aware. Pt rested well throughout night. No acute events.

## 2022-04-19 NOTE — DISCHARGE SUMMARY
Discharge Summary  [place], [place]    Patient Name: Chris De Guzman  : 1965  MRN: [id number]    Date of Admission: 2022  Date of Discharge:  2022    IDENTIFICATION:  Chris De Guzman is a 57 y.o. male who lives in [facility] Problems    Diagnosis    • **Coronary artery disease involving native coronary artery of native heart with angina pectoris (HCC)      · Cardiac catheterization (3/12/2021): Severe one-vessel CAD involving chronic total occlusion of the RCA.  Normal LVEF  · Unsuccessful  PCI attempt from both antegrade and retrograde approaches, 2022     • Hyperlipidemia LDL goal <70      • High intensity statin therapy indicated given the presence of CAD         Summary of Hospitalization:  57-year-old gentleman with CCS class II angina and known chronic total occlusion of the RCA.  He was referred to me for  PCI.  He underwent his procedure via bilateral groin access on 2022.  Despite attempts antegrade and retrograde, I was unable to cross the occlusion.  Following the procedure, the patient did experience some chest pain likely related to manipulation of septal collaterals.  A stat echo was performed which did not show pericardial effusion.  By the morning, he felt better and was ready for discharge.    I will discuss the case further with a interventional colleague at [facility].  Patient will probably be referred for repeat attempt at  PCI in [place].    Procedures Performed   PCI of RCA.  Bilateral groin access         Pertinent Test Results:  Results from last 7 days   Lab Units 22  0832 22  0820   WBC 10*3/mm3  --  6.77   HEMOGLOBIN g/dL  --  14.3   HEMATOCRIT %  --  42.4   PLATELETS 10*3/mm3  --  207   CREATININE mg/dL 1.10  --            Invalid input(s): PROT, LABALBU   Results from last 7 days   Lab Units 22  0820   CHOLESTEROL mg/dL 107   TRIGLYCERIDES mg/dL 223*   HDL CHOL  mg/dL 37*   LDL CHOL mg/dL 35                Pending Labs     Order Current Status    Basic Metabolic Panel Collected (04/19/22 0751)    CBC (No Diff) Collected (04/19/22 0751)          Results for orders placed during the hospital encounter of 04/18/22    Adult Transthoracic Echo Limited W/ Cont if Necessary Per Protocol    Interpretation Summary  · Limited echo to evaluate for pericardial effusion.  · Left ventricular systolic function is normal. Estimated left ventricular EF = 55%.  · There is no pericardial effusion present.        Physical Exam at Discharge    Vital Signs  Temp:  [97.7 °F (36.5 °C)-98.6 °F (37 °C)] 98.6 °F (37 °C)  Heart Rate:  [62-93] 80  Resp:  [16-20] 18  BP: (108-161)/() 122/87    Constitutional:       Appearance: Healthy appearance. Well-developed.   Eyes:      General: Lids are normal. No scleral icterus.  HENT:      Head: Normocephalic and atraumatic.   Neck:      Thyroid: No thyroid mass.      Vascular: No carotid bruit or JVD. JVD normal.   Pulmonary:      Effort: Pulmonary effort is normal.      Breath sounds: Normal breath sounds.   Cardiovascular:      Normal rate. Regular rhythm.      Murmurs: There is no murmur.      No gallop.      Comments: Bilateral groin access without hematoma  Musculoskeletal:      Extremities: No clubbing present.Skin:     General: Skin is warm and dry. There is no cyanosis.   Neurological:      General: No focal deficit present.      Mental Status: Alert.   Psychiatric:         Attention and Perception: Attention normal.         Behavior: Behavior normal. Behavior is cooperative.          Discharge Disposition  Home or Self Care         Discharge Medications      New Medications      Instructions Start Date   clopidogrel 75 MG tablet  Commonly known as: PLAVIX   75 mg, Oral, Daily   Start Date: April 20, 2022        Continue These Medications      Instructions Start Date   acetaminophen 325 MG tablet  Commonly known as: TYLENOL   650 mg, Oral, Every  6 Hours PRN      aspirin 81 MG EC tablet   81 mg, Oral, Daily      cyanocobalamin 250 MCG tablet  Commonly known as: VITAMIN B-12   500 mcg, Oral, Daily      esomeprazole 40 MG capsule  Commonly known as: nexIUM   40 mg, Oral, Every Morning Before Breakfast      fenofibrate 160 MG tablet   160 mg, Oral, Daily      fish oil 1000 MG capsule capsule   1,000 mg, Oral, 2 Times Daily With Meals      isosorbide mononitrate 60 MG 24 hr tablet  Commonly known as: IMDUR   60 mg, Oral, 2 Times Daily      losartan 25 MG tablet  Commonly known as: COZAAR   25 mg, Oral, Daily      metoprolol tartrate 25 MG tablet  Commonly known as: LOPRESSOR   25 mg, Oral, 2 Times Daily      nitroglycerin 0.4 MG SL tablet  Commonly known as: Nitrostat   0.4 mg, Sublingual, Every 5 Minutes PRN, Take no more than 3 doses in 15 minutes.      rosuvastatin 10 MG tablet  Commonly known as: CRESTOR   10 mg, Oral, Daily      vitamin D3 125 MCG (5000 UT) capsule capsule   5,000 Units, Oral, Daily             Discharge Diet: Cardiac    Activity at Discharge: Ad miguel angel.    Future Appointments   Date Time Provider Department Center   5/19/2022 11:20 AM Payal Boyd APRN [facility] [place] [place] [facility]   9/20/2022  9:30 AM Rocco Tabares MD [facility]     Additional Instructions for the Follow-ups that You Need to Schedule     Discharge Follow-up with PCP   As directed       Currently Documented PCP:    Marge Antony APRN    PCP Phone Number:    [phone]     Follow Up Details: See in 2 weeks         Discharge Follow-up with Specified Provider: JENI Vallejo; 1 Month   As directed      To: JENI Vallejo    Follow Up: 1 Month               Test Results Pending at Discharge  Pending Labs     Order Current Status    Basic Metabolic Panel Collected (04/19/22 0751)    CBC (No Diff) Collected (04/19/22 0751)           Electronically signed by Omer Matthew IV, MD, 04/19/22, 8:22 AM EDT.    Time: Discharge 25 min

## 2022-04-19 NOTE — CASE MANAGEMENT/SOCIAL WORK
Case Management Discharge Note      Final Note: Spoke with pt. at bedside. His plan is to dc to home. No needs are voiced. Family to provide transport.         Selected Continued Care - Discharged on 4/19/2022 Admission date: 4/18/2022 - Discharge disposition: Home or Self Care    Destination    No services have been selected for the patient.              Durable Medical Equipment    No services have been selected for the patient.              Dialysis/Infusion    No services have been selected for the patient.              Home Medical Care    No services have been selected for the patient.              Therapy    No services have been selected for the patient.              Community Resources    No services have been selected for the patient.              Community & DME    No services have been selected for the patient.                       Final Discharge Disposition Code: 01 - home or self-care

## 2022-04-22 LAB
QT INTERVAL: 414 MS
QTC INTERVAL: 427 MS

## 2022-05-19 ENCOUNTER — OFFICE VISIT (OUTPATIENT)
Dept: CARDIOLOGY | Facility: CLINIC | Age: 57
End: 2022-05-19

## 2022-05-19 VITALS
HEART RATE: 70 BPM | BODY MASS INDEX: 30.59 KG/M2 | OXYGEN SATURATION: 97 % | SYSTOLIC BLOOD PRESSURE: 104 MMHG | HEIGHT: 75 IN | DIASTOLIC BLOOD PRESSURE: 68 MMHG | WEIGHT: 246 LBS

## 2022-05-19 DIAGNOSIS — E78.5 HYPERLIPIDEMIA LDL GOAL <70: ICD-10-CM

## 2022-05-19 DIAGNOSIS — I10 ESSENTIAL HYPERTENSION: ICD-10-CM

## 2022-05-19 DIAGNOSIS — I25.119 CORONARY ARTERY DISEASE INVOLVING NATIVE CORONARY ARTERY OF NATIVE HEART WITH ANGINA PECTORIS: Primary | Chronic | ICD-10-CM

## 2022-05-19 PROCEDURE — 99214 OFFICE O/P EST MOD 30 MIN: CPT | Performed by: NURSE PRACTITIONER

## 2022-05-19 NOTE — ASSESSMENT & PLAN NOTE
· Hypertension is controlled  · Continue metoprolol tartrate 25 mg twice daily  · Continue losartan 25 mg daily

## 2022-05-19 NOTE — ASSESSMENT & PLAN NOTE
· Continue metoprolol tartrate 25 mg twice daily  · Continue isosorbide 60 mg twice daily  · Continue aspirin and Plavix

## 2022-06-02 LAB
QT INTERVAL: 408 MS
QTC INTERVAL: 420 MS

## 2022-09-15 NOTE — PROGRESS NOTES
Subjective:     Encounter Date:09/20/2022      Patient ID: Chris De Guzman is a 57 y.o.  white male from [place], [place].    HEALTHCARE PROVIDER: JENI Delgado    Chief Complaint:   Chief Complaint   Patient presents with   • Coronary Artery Disease   • Hypertension   • Hyperlipidemia     Problem List:  1. Coronary artery disease:  a. Left heart catheterization March 2021:  of the RCA with well-developed left-to-right, collaterals, no significant LAD or circumflex disease, EF 55%  b. Echocardiogram 3/11/2021: EF 51-55%, no significant valvular abnormalities  c. Left heart catheterization 4/18/2022: Unsuccessful attempt at  PCI of the proximal RCA using both antegrade and retrograde approaches, referral to Timothy Larios at [facility] for reattempt at RCA  PCI  d. Left heart catheterization 4/18/2022: Limited echo to evaluate for pericardial effusion, LVEF 55%, no pericardial effusion present  2. Hyperlipidemia/hypertriglyceridemia  3. Hypertension  4. Prediabetes; hemoglobin A1c 6.3% March 2021    No Known Allergies    Current Outpatient Medications   Medication Instructions   • acetaminophen (TYLENOL) 650 mg, Oral, Every 6 Hours PRN   • aspirin 81 mg, Oral, Daily   • cyanocobalamin (VITAMIN B-12) 500 mcg, Oral, Daily   • esomeprazole (NEXIUM) 40 mg, Oral, Every Morning Before Breakfast   • fenofibrate 160 mg, Oral, Daily   • fish oil 1,000 mg, Oral, 2 Times Daily With Meals   • isosorbide mononitrate (IMDUR) 60 mg, Oral, 2 Times Daily   • losartan (COZAAR) 25 mg, Oral, Daily   • metoprolol tartrate (LOPRESSOR) 25 mg, Oral, 2 Times Daily   • nitroglycerin (NITROSTAT) 0.4 mg, Sublingual, Every 5 Minutes PRN, Take no more than 3 doses in 15 minutes.   • rosuvastatin (CRESTOR) 10 mg, Oral, Daily   • vitamin B-12 (CYANOCOBALAMIN) 1,000 mcg, Oral, Daily   • vitamin D3 5,000 Units, Oral, Daily       HISTORY OF PRESENT ILLNESS:  The patient is here for a 6-month follow-up.  In the  "interim he had a heart catheterization 4/18/2022 and had an unsuccessful attempt at  PCI of the proximal RCA using both antegrade and retrograde approaches and a referral was made to Timothy Larios at [facility] for reattempt at RCA  PCI. He talked to Dr. Larios on the phone but has decided to continue with medical therapy for the time being.  He reports his activity level is normal and he is able to do most activities without eliciting mild chest and shoulder pain.  Most days he does not have to take a second isosorbide however he does note that recently he took 1 in the afternoon after helping his son pour concrete.  Overall, he is satisfied with his current activity level.    ROS   All other systems reviewed and otherwise negative.    Procedures       Objective:       Vitals:    09/20/22 0943   BP: 140/78   BP Location: Left arm   Patient Position: Sitting   Pulse: 64   SpO2: 97%   Weight: 113 kg (250 lb)   Height: 190.5 cm (75\")     Body mass index is 31.25 kg/m².  Last weight March 2022 was 246 pounds  Constitutional:       Appearance: Healthy appearance. Not in distress.   Neck:      Vascular: No JVR. JVD normal.   Pulmonary:      Effort: Pulmonary effort is normal.      Breath sounds: Normal breath sounds. No wheezing. No rhonchi. No rales.   Chest:      Chest wall: Not tender to palpatation.   Cardiovascular:      PMI at left midclavicular line. Normal rate. Regular rhythm. Normal S1. Normal S2.      Murmurs: There is no murmur.      No gallop. No click. No rub.   Pulses:     Dorsalis pedis: 1+ bilaterally.     Posterior tibial: 1+ bilaterally.  Edema:     Peripheral edema absent.   Abdominal:      General: Bowel sounds are normal.      Palpations: Abdomen is soft.      Tenderness: There is no abdominal tenderness.   Musculoskeletal: Normal range of motion.         General: No tenderness. Skin:     General: Skin is warm and dry.   Neurological:      General: No focal deficit present.      Mental " Status: Alert and oriented to person, place and time.         Lab Review:   Lab Results   Component Value Date    GLUCOSE 109 (H) 04/19/2022    BUN 12 04/19/2022    CREATININE 0.99 04/19/2022    EGFRIFNONA 65 03/12/2021    BCR 12.1 04/19/2022    CO2 23.0 04/19/2022    CALCIUM 9.0 04/19/2022    ALBUMIN 4.30 03/12/2021    AST 25 03/12/2021    ALT 38 03/12/2021       Lab Results   Component Value Date    WBC 9.93 04/19/2022    HGB 13.6 04/19/2022    HCT 39.6 04/19/2022    MCV 85.5 04/19/2022     04/19/2022       Lab Results   Component Value Date    HGBA1C 6.30 (H) 03/12/2021       Lab Results   Component Value Date    CHOL 107 04/18/2022    CHOL 96 03/12/2021     Lab Results   Component Value Date    TRIG 223 (H) 04/18/2022    TRIG 134 03/12/2021     Lab Results   Component Value Date    HDL 37 (L) 04/18/2022    HDL 37 (L) 03/12/2021     Lab Results   Component Value Date    LDL 35 04/18/2022    LDL 35 03/12/2021     Advance Care Planning   ACP discussion was declined by the patient. Patient does not have an advance directive, declines further assistance.         Assessment:     Overall continued acceptable course with no new interim cardiopulmonary complaints with acceptable functional status. We will defer additional diagnostic or therapeutic intervention from a cardiac perspective at this time.The patient has not pursued having a repeat attempt for PCI with Dr. Larios and was recommended to rethink proceeding with this if he has recurrent symptoms.     Diagnosis Plan   1. Coronary artery disease involving native coronary artery of native heart with angina pectoris (HCC)     2. Essential hypertension     3. Hyperlipidemia LDL goal <70            Plan:       1. Patient to continue current medications and close follow up with the above providers.  2. Mr. De Guzman was instructed to call if his anginal symptoms worsen for consideration of titration of his medical therapy.  3. He is on 10 mg of rosuvastatin but  his last LDL was 35.  4. Tentative cardiology follow up in April 2023 or patient may return sooner PRN.

## 2022-09-20 ENCOUNTER — OFFICE VISIT (OUTPATIENT)
Dept: CARDIOLOGY | Facility: CLINIC | Age: 57
End: 2022-09-20

## 2022-09-20 VITALS
SYSTOLIC BLOOD PRESSURE: 140 MMHG | WEIGHT: 250 LBS | HEART RATE: 64 BPM | OXYGEN SATURATION: 97 % | HEIGHT: 75 IN | BODY MASS INDEX: 31.08 KG/M2 | DIASTOLIC BLOOD PRESSURE: 78 MMHG

## 2022-09-20 DIAGNOSIS — I25.119 CORONARY ARTERY DISEASE INVOLVING NATIVE CORONARY ARTERY OF NATIVE HEART WITH ANGINA PECTORIS: Primary | Chronic | ICD-10-CM

## 2022-09-20 DIAGNOSIS — I10 ESSENTIAL HYPERTENSION: ICD-10-CM

## 2022-09-20 DIAGNOSIS — E78.5 HYPERLIPIDEMIA LDL GOAL <70: ICD-10-CM

## 2022-09-20 PROCEDURE — 99213 OFFICE O/P EST LOW 20 MIN: CPT | Performed by: INTERNAL MEDICINE

## 2022-09-20 RX ORDER — LANOLIN ALCOHOL/MO/W.PET/CERES
1000 CREAM (GRAM) TOPICAL DAILY
COMMUNITY

## 2023-01-13 NOTE — PROGRESS NOTES
"[facility]      Identification: Chris De Guzman is a 57 y.o. male who resides in [place], [place]    Reason for visit:  Follow-up (Hospital)      Subjective      Chris De Guzman presents to [facility] [place] by [facility] for followup.    History of Present Illness  Patient is a 57-year-old gentleman who returns today for follow-up of his coronary artery disease and cardiac risk factors.  In April the patient presented to [facility] for attempted PCI of a chronic occlusion of the right coronary artery for CCS class II symptoms.  He underwent the procedure on 4/18/2022 and despite attempts antegrade and retrograde the lesion was unable to be crossed.  The patient was discharged home on dual antiplatelet therapy with aspirin and Plavix and referred to Dr. Carmelo Carballo at [facility] for consideration of reattempt of PCI to the .  He had a telehealth visit with him last week.  The patient has opted not to proceed with a another attempt at opening the chronic occlusion.  There has previously been 2 attempts at one of which occurred during his initial heart cath in 2021 and the most recently last month as previously mentioned above.  The patient reports his symptoms are more of a \"nuisance\".  He tends to only have symptoms intermittently when he is not doing much in terms of exertion.  His symptoms always resolve if he stops what he is doing.  When he is doing moderate to strenuous activities such as weed eating or even trimming trees he does not have any anginal symptoms.  He has never used his sublingual nitroglycerin. He has multiple questions today regarding if he can travel and the need for continuation of his dual antiplatelet therapy.    Review of Systems   Constitutional: Negative for malaise/fatigue.   Eyes: Negative for vision loss in left eye and vision loss in right eye.   Cardiovascular: Negative for chest pain, dyspnea on exertion, near-syncope, orthopnea, " "palpitations, paroxysmal nocturnal dyspnea and syncope.   Musculoskeletal: Negative for myalgias.   Neurological: Negative for brief paralysis, excessive daytime sleepiness, focal weakness, numbness, paresthesias and weakness.   All other systems reviewed and are negative.      Objective     /68 (BP Location: Left arm, Patient Position: Sitting)   Pulse 70   Ht 190.5 cm (75\")   Wt 112 kg (246 lb)   SpO2 97%   BMI 30.75 kg/m²       Constitutional:       Appearance: Healthy appearance. Well-developed.   Eyes:      General: Lids are normal. No scleral icterus.     Conjunctiva/sclera: Conjunctivae normal.   HENT:      Head: Normocephalic and atraumatic.   Neck:      Thyroid: No thyromegaly.      Vascular: No carotid bruit or JVD.   Pulmonary:      Effort: Pulmonary effort is normal.      Breath sounds: Normal breath sounds. No wheezing. No rhonchi. No rales.   Cardiovascular:      Normal rate. Regular rhythm.      Murmurs: There is no murmur.      No gallop. No rub.   Pulses:     Intact distal pulses.   Edema:     Peripheral edema absent.   Abdominal:      General: There is no distension.      Palpations: Abdomen is soft. There is no abdominal mass.   Musculoskeletal:      Cervical back: Normal range of motion. Skin:     General: Skin is warm and dry.      Findings: No rash.   Neurological:      General: No focal deficit present.      Mental Status: Alert and oriented to person, place, and time.      Gait: Gait is intact.   Psychiatric:         Attention and Perception: Attention normal.         Mood and Affect: Mood normal.         Behavior: Behavior normal.         Result Review :    Lab Results   Component Value Date    GLUCOSE 109 (H) 04/19/2022    BUN 12 04/19/2022    CREATININE 0.99 04/19/2022    EGFRIFNONA 65 03/12/2021    BCR 12.1 04/19/2022    K 3.7 04/19/2022    CO2 23.0 04/19/2022    CALCIUM 9.0 04/19/2022    ALBUMIN 4.30 03/12/2021    AST 25 03/12/2021    ALT 38 03/12/2021     Lab Results "   Component Value Date    WBC 9.93 04/19/2022    HGB 13.6 04/19/2022    HCT 39.6 04/19/2022    MCV 85.5 04/19/2022     04/19/2022     Lab Results   Component Value Date    CHOL 107 04/18/2022    TRIG 223 (H) 04/18/2022    HDL 37 (L) 04/18/2022    LDL 35 04/18/2022     Lab Results   Component Value Date    HGBA1C 6.30 (H) 03/12/2021             Assessment     Problem List Items Addressed This Visit        Cardiac and Vasculature    Coronary artery disease involving native coronary artery of native heart with angina pectoris (HCC) - Primary (Chronic)    Overview     · Cardiac catheterization (3/12/2021): Severe one-vessel CAD involving chronic total occlusion of the RCA.  Normal LVEF  · Unsuccessful  PCI attempt from both antegrade and retrograde approaches, 4/18/2022           Current Assessment & Plan     · Continue metoprolol tartrate 25 mg twice daily  · Continue isosorbide 60 mg twice daily  · Continue aspirin and Plavix           Hyperlipidemia LDL goal <70    Overview     • High intensity statin therapy indicated given the presence of CAD           Current Assessment & Plan     · Continue Crestor 10 mg daily           Essential hypertension    Current Assessment & Plan     · Hypertension is controlled  · Continue metoprolol tartrate 25 mg twice daily  · Continue losartan 25 mg daily             The patient appears to be doing well and has stable CCS class II symptoms.  I have recommended he continue on dual antiplatelet therapy for now with aspirin and Plavix.  He should also continue long-acting nitrate and beta-blocker therapy.  If his anginal symptoms worsen I would recommend he rethink proceeding with opening his  with Dr. Carballo however it is reasonable to defer for now as the patient is able to be active and his symptoms do not appear life altering at the moment.  The patient can travel but should keep his sublingual nitroglycerin with him in case of anginal symptoms.  If he were to travel  to high elevations he should be aware this could provoke angina.  He should follow-up with his primary cardiologist Dr. Tabares at already scheduled appointment in September.    Plan   • Follow-up with Dr. Tabares at already scheduled appointment  • Continue current medications  • If patient changes his mind regarding reattempt at opening the  he should contact Dr. Carballo      Follow-up   Return in about 6 months (around 11/19/2022), or if symptoms worsen or fail to improve.        Payal Boyd APRN  5/19/2022   [Initial Visit ___] : [unfilled] is here today for an initial visit  for [unfilled]

## 2023-07-24 NOTE — PROGRESS NOTES
[facility]  Follow Up Visit  Chris De Guzman  1965    VISIT DATE:  07/25/23    PCP:   Marge Antony, APRN  [address] [place] [address] [address]          CC:  Coronary Artery Disease, Hypertension, and Hyperlipidemia      Problem List:  1. Coronary artery disease              -Left heart catheterization March 2021:  of the RCA     with well-developed    left-to-right,                collaterals, no significant LAD or circumflex disease, EF     55%   -Unsuccessful  revascularization attempt April 2022              -Echo EF 51-55%, no significant valvular abnormalities  2. Hyperlipidemia/hypertriglyceridemia  3.  Hypertension       History of Present Illness:  Chris De Guzman  Is a 58 y.o. male with pertinent cardiac history detailed above.  He has stable anginal symptoms.  Typically controlled with 1 Imdur daily.  Patient will take a second Imdur if he knows he is anticipating strenuous physical activity on a given day.  Does not have to do this regularly.  Blood pressures well controlled.  He is due for a physical next month.  He will get his lipids rechecked then.  They were well controlled on last available labs for review.  At this time he feels like symptoms are controlled and does not feel the need to pursue any additional attempts at  PCI, I agree with this and let him know what really does not if he had an escalation in anginal symptoms.  Echocardiogram last year showing normal ejection fraction.  Does have hand arthritis that he will take an occasional NSAID 4.  No other complaints today          Patient Active Problem List    Diagnosis Date Noted    Essential hypertension 05/19/2022    Hyperlipidemia LDL goal <70 03/15/2022     Note Last Updated: 3/15/2022     High intensity statin therapy indicated given the presence of CAD      Coronary artery disease involving native coronary artery of native heart with angina pectoris 02/04/2022     Note Last  Updated: 4/18/2022     Cardiac catheterization (3/12/2021): Severe one-vessel CAD involving chronic total occlusion of the RCA.  Normal LVEF  Unsuccessful  PCI attempt from both antegrade and retrograde approaches, 4/18/2022         No Known Allergies    Social History     Socioeconomic History    Marital status:    Tobacco Use    Smoking status: Never    Smokeless tobacco: Former     Types: Chew   Vaping Use    Vaping Use: Never used   Substance and Sexual Activity    Alcohol use: Yes     Comment: 1 or 2 drinks per month    Drug use: Never    Sexual activity: Yes     Partners: Female       Family History   Problem Relation Age of Onset    Gallbladder disease Mother     Liver cancer Father     Hypertension Sister     Hypertension Brother     Diabetes type II Brother        Current Medications:    Current Outpatient Medications:     acetaminophen (TYLENOL) 325 MG tablet, Take 2 tablets by mouth Every 6 (Six) Hours As Needed for Mild Pain., Disp: , Rfl:     aspirin 81 MG EC tablet, Take 1 tablet by mouth Daily., Disp: 30 tablet, Rfl: 6    esomeprazole (nexIUM) 40 MG capsule, Take 1 capsule by mouth Every Morning Before Breakfast., Disp: , Rfl:     fenofibrate 160 MG tablet, Take 1 tablet by mouth Daily., Disp: , Rfl:     isosorbide mononitrate (IMDUR) 60 MG 24 hr tablet, Take 1 tablet by mouth 2 (Two) Times a Day., Disp: 90 tablet, Rfl: 3    losartan (COZAAR) 25 MG tablet, Take 1 tablet by mouth Daily., Disp: , Rfl:     metoprolol tartrate (LOPRESSOR) 25 MG tablet, Take 1 tablet by mouth 2 (Two) Times a Day., Disp: 180 tablet, Rfl: 1    nitroglycerin (Nitrostat) 0.4 MG SL tablet, Place 1 tablet under the tongue Every 5 (Five) Minutes As Needed for Chest Pain. Take no more than 3 doses in 15 minutes., Disp: 30 tablet, Rfl: 6    Omega-3 Fatty Acids (fish oil) 1000 MG capsule capsule, Take 1 capsule by mouth 2 (Two) Times a Day With Meals., Disp: , Rfl:     rosuvastatin (CRESTOR) 10 MG tablet, Take 1 tablet by  "mouth Daily., Disp: 30 tablet, Rfl: 6    vitamin B-12 (CYANOCOBALAMIN) 1000 MCG tablet, Take 1 tablet by mouth Daily., Disp: , Rfl:     vitamin D3 125 MCG (5000 UT) capsule capsule, Take 1 capsule by mouth Daily., Disp: , Rfl:      Review of Systems   Cardiovascular:  Positive for chest pain (stable angina). Negative for dyspnea on exertion, irregular heartbeat, near-syncope and palpitations.   Musculoskeletal:  Positive for arthritis.     Vitals:    07/25/23 0953   BP: 126/70   BP Location: Right arm   Patient Position: Sitting   Pulse: 70   SpO2: 97%   Weight: 111 kg (244 lb)   Height: 190.5 cm (75\")       Physical Exam  Constitutional:       Appearance: Normal appearance.   Neck:      Vascular: No carotid bruit.   Cardiovascular:      Rate and Rhythm: Normal rate and regular rhythm.      Pulses: Normal pulses.      Heart sounds: Normal heart sounds.   Pulmonary:      Effort: Pulmonary effort is normal.      Breath sounds: Normal breath sounds.   Musculoskeletal:      Right lower leg: No edema.      Left lower leg: No edema.   Neurological:      General: No focal deficit present.      Mental Status: He is alert.       Diagnostic Data:  Procedures  Lab Results   Component Value Date    TRIG 223 (H) 04/18/2022    HDL 37 (L) 04/18/2022     Lab Results   Component Value Date    GLUCOSE 109 (H) 04/19/2022    BUN 12 04/19/2022    CREATININE 0.99 04/19/2022     04/19/2022    K 3.7 04/19/2022     04/19/2022    CO2 23.0 04/19/2022     Lab Results   Component Value Date    HGBA1C 6.30 (H) 03/12/2021     Lab Results   Component Value Date    WBC 9.93 04/19/2022    HGB 13.6 04/19/2022    HCT 39.6 04/19/2022     04/19/2022       Assessment:   Diagnosis Plan   1. Hyperlipidemia LDL goal <70  Comprehensive Metabolic Panel    Lipid Panel          Plan:    1.  Stable angina,  -class II symptoms, stable well-controlled at this time.  -Heart catheterization RCA  with good left to right collaterals  -Was not " able to have  revascularized here, can consider second opinion with  Dr. Carballo at [facility] if with recurrent angina in the future  -On ASA,  metoprolol 25mg BID, Imdur Crestor, Omega-3 1000mg BID (vascepa was denied by insurance), fenofibrate    2.  Hypertension  -currently controlled on losartan and metoprolol     3.  Hyperlipidemia  -Total cholesterol  110, LDL 39, HDL 43, triglycerides 147  - On fenofibrate and crestor 10mg  -Omega-3 1000mg BID   -Obtain follow-up lipids with his physical next month    Plan to follow-up in a year.  Asked patient to call with any change in anginal symptom pattern.      ACP discussion was held with the patient during this visit. Patient does not have an advance directive, declines further assistance.      Rocco Tabares MD [facility]

## 2023-07-25 ENCOUNTER — OFFICE VISIT (OUTPATIENT)
Dept: CARDIOLOGY | Facility: CLINIC | Age: 58
End: 2023-07-25
Payer: COMMERCIAL

## 2023-07-25 VITALS
WEIGHT: 244 LBS | BODY MASS INDEX: 30.34 KG/M2 | SYSTOLIC BLOOD PRESSURE: 126 MMHG | HEIGHT: 75 IN | OXYGEN SATURATION: 97 % | DIASTOLIC BLOOD PRESSURE: 70 MMHG | HEART RATE: 70 BPM

## 2023-07-25 DIAGNOSIS — E78.5 HYPERLIPIDEMIA LDL GOAL <70: Primary | ICD-10-CM

## 2023-07-25 PROCEDURE — 99214 OFFICE O/P EST MOD 30 MIN: CPT | Performed by: INTERNAL MEDICINE

## 2024-08-16 NOTE — PROGRESS NOTES
[facility]  Follow Up Visit  Chris De Guzman  1965    VISIT DATE:  08/20/24    PCP:   Cher Powell DO  [address] [address]          CC:  Hyperlipidemia LDL goal <70 and Coronary artery disease involving native coronary artery of      Problem List:  1. Coronary artery disease     -Left heart catheterization March 2021:  of the RCA     with well-developed    left-to-right,                collaterals, no significant LAD or circumflex disease, EF     55%              -Unsuccessful  revascularization attempt April 2022              -Echo EF 51-55%, no significant valvular abnormalities  2. Hyperlipidemia/hypertriglyceridemia  3.  Hypertension      History of Present Illness:  Chris De Guzman  Is a 59 y.o. male with pertinent cardiac history detailed above.  Patient has been doing well with no anginal symptoms.  Blood pressure lipids hemoglobin A1c all doing well.  He does endorse ED side effect which could be related to his beta-blocker.  We discussed the change to the diltiazem and tapering off of metoprolol to assess for improvement.  He is on long-acting nitrates which would complicate use of phosphodiesterase inhibitors.  Otherwise has not had other new complaints since visit last year.  He also has been losing weight with lifestyle changes      Patient Active Problem List    Diagnosis Date Noted    Essential hypertension 05/19/2022    Hyperlipidemia LDL goal <70 03/15/2022     Note Last Updated: 3/15/2022     High intensity statin therapy indicated given the presence of CAD      Coronary artery disease involving native coronary artery of native heart with angina pectoris 02/04/2022     Note Last Updated: 4/18/2022     Cardiac catheterization (3/12/2021): Severe one-vessel CAD involving chronic total occlusion of the RCA.  Normal LVEF  Unsuccessful  PCI attempt from both antegrade and retrograde approaches, 4/18/2022         No Known  Allergies    Social History     Socioeconomic History    Marital status:    Tobacco Use    Smoking status: Never    Smokeless tobacco: Former     Types: Chew   Vaping Use    Vaping status: Never Used   Substance and Sexual Activity    Alcohol use: Yes     Comment: 1 or 2 drinks per month    Drug use: Never    Sexual activity: Yes     Partners: Female       Family History   Problem Relation Age of Onset    Gallbladder disease Mother     Liver cancer Father     Hypertension Sister     Hypertension Brother     Diabetes type II Brother        Current Medications:    Current Outpatient Medications:     acetaminophen (TYLENOL) 325 MG tablet, Take 2 tablets by mouth Every 6 (Six) Hours As Needed for Mild Pain., Disp: , Rfl:     aspirin 81 MG EC tablet, Take 1 tablet by mouth Daily., Disp: 30 tablet, Rfl: 6    esomeprazole (nexIUM) 40 MG capsule, Take 1 capsule by mouth Every Morning Before Breakfast., Disp: , Rfl:     fenofibrate 160 MG tablet, Take 1 tablet by mouth Daily., Disp: , Rfl:     isosorbide mononitrate (IMDUR) 60 MG 24 hr tablet, Take 1 tablet by mouth 2 (Two) Times a Day., Disp: 90 tablet, Rfl: 3    losartan (COZAAR) 25 MG tablet, Take 1 tablet by mouth Daily., Disp: , Rfl:     metoprolol tartrate (LOPRESSOR) 25 MG tablet, Take 1 tablet by mouth 2 (Two) Times a Day., Disp: 180 tablet, Rfl: 1    nitroglycerin (Nitrostat) 0.4 MG SL tablet, Place 1 tablet under the tongue Every 5 (Five) Minutes As Needed for Chest Pain. Take no more than 3 doses in 15 minutes., Disp: 30 tablet, Rfl: 6    Omega-3 Fatty Acids (fish oil) 1000 MG capsule capsule, Take 1 capsule by mouth 2 (Two) Times a Day With Meals., Disp: , Rfl:     rosuvastatin (CRESTOR) 10 MG tablet, Take 1 tablet by mouth Daily., Disp: 30 tablet, Rfl: 6    vitamin B-12 (CYANOCOBALAMIN) 1000 MCG tablet, Take 1 tablet by mouth Daily., Disp: , Rfl:     vitamin D3 125 MCG (5000 UT) capsule capsule, Take 1 capsule by mouth Daily., Disp: , Rfl:      Review of  "Systems   Cardiovascular:  Negative for chest pain, dyspnea on exertion, near-syncope and palpitations.        Positive ED   Respiratory: Negative.         Vitals:    08/20/24 0953   BP: 118/70   BP Location: Right arm   Patient Position: Sitting   Cuff Size: Adult   Pulse: 58   SpO2: 97%   Weight: 101 kg (223 lb 3.2 oz)   Height: 188 cm (74\")       Physical Exam  Constitutional:       Appearance: Normal appearance.   Neck:      Vascular: No carotid bruit.   Cardiovascular:      Rate and Rhythm: Normal rate and regular rhythm.      Heart sounds: No murmur heard.  Pulmonary:      Effort: Pulmonary effort is normal.      Breath sounds: Normal breath sounds.   Musculoskeletal:      Right lower leg: No edema.      Left lower leg: No edema.   Neurological:      General: No focal deficit present.      Mental Status: He is alert.         Diagnostic Data:    ECG 12 Lead    Date/Time: 8/20/2024 10:18 AM  Performed by: Rocco Tabares MD    Authorized by: Rocco Tabares MD  Comparison: compared with previous ECG from 4/18/2022  Rhythm: sinus bradycardia  Rate: bradycardic  BPM: 58  QRS axis: normal    Clinical impression: normal ECG        Lab Results   Component Value Date    TRIG 223 (H) 04/18/2022    HDL 37 (L) 04/18/2022     Lab Results   Component Value Date    GLUCOSE 109 (H) 04/19/2022    BUN 12 04/19/2022    CREATININE 0.99 04/19/2022     04/19/2022    K 3.7 04/19/2022     04/19/2022    CO2 23.0 04/19/2022     Lab Results   Component Value Date    HGBA1C 6.30 (H) 03/12/2021     Lab Results   Component Value Date    WBC 9.93 04/19/2022    HGB 13.6 04/19/2022    HCT 39.6 04/19/2022     04/19/2022       Assessment:   Diagnosis Plan   1. Coronary artery disease involving native coronary artery of native heart with angina pectoris  ECG 12 Lead      2. Hyperlipidemia LDL goal <70        3. Essential hypertension            Plan:    1.  Stable angina,  - no significant symptoms at this " time  -Heart catheterization RCA  with good left to right collaterals  -Was not able to have  revascularized here, can consider second opinion with  Dr. Carballo at [facility] if with recurrent angina in the future  -On ASA,   Imdur Crestor, Omega-3 1000mg BID (vascepa was denied by insurance), fenofibrate  -Due to possible ED side effect from metoprolol will wean off Toprol over the next 2 weeks and start diltiazem 120 mg extended release in its place     2.  Hypertension  -currently controlled on losartan  -Transition off metoprolol as above     3.  Hyperlipidemia  -Total cholesterol 87, triglycerides 66, HDL 38 LDL 36  - On fenofibrate and crestor 10mg  -Omega-3 1000mg BID   -Last A1c r6 may 2024 controlling with diet and weight  -Bmi 28     Doing well, stable with no angina.  Risk factors  well controlled      ACP discussion was declined by the patient. Patient does not have an advance directive, declines further assistance.      Rocco Tabares MD [facility]

## 2024-08-20 ENCOUNTER — OFFICE VISIT (OUTPATIENT)
Dept: CARDIOLOGY | Facility: CLINIC | Age: 59
End: 2024-08-20
Payer: COMMERCIAL

## 2024-08-20 VITALS
HEART RATE: 58 BPM | BODY MASS INDEX: 28.64 KG/M2 | OXYGEN SATURATION: 97 % | SYSTOLIC BLOOD PRESSURE: 118 MMHG | HEIGHT: 74 IN | WEIGHT: 223.2 LBS | DIASTOLIC BLOOD PRESSURE: 70 MMHG

## 2024-08-20 DIAGNOSIS — E78.5 HYPERLIPIDEMIA LDL GOAL <70: ICD-10-CM

## 2024-08-20 DIAGNOSIS — I10 ESSENTIAL HYPERTENSION: ICD-10-CM

## 2024-08-20 DIAGNOSIS — I25.119 CORONARY ARTERY DISEASE INVOLVING NATIVE CORONARY ARTERY OF NATIVE HEART WITH ANGINA PECTORIS: Primary | Chronic | ICD-10-CM

## 2024-08-20 PROCEDURE — 99214 OFFICE O/P EST MOD 30 MIN: CPT | Performed by: INTERNAL MEDICINE

## 2024-08-20 PROCEDURE — 93000 ELECTROCARDIOGRAM COMPLETE: CPT | Performed by: INTERNAL MEDICINE

## 2024-08-20 RX ORDER — DILTIAZEM HYDROCHLORIDE 120 MG/1
120 CAPSULE, EXTENDED RELEASE ORAL DAILY
Qty: 30 CAPSULE | Refills: 11 | Status: SHIPPED | OUTPATIENT
Start: 2024-08-20

## 2024-09-11 RX ORDER — DILTIAZEM HYDROCHLORIDE 120 MG/1
240 CAPSULE, EXTENDED RELEASE ORAL DAILY
Qty: 30 CAPSULE | Refills: 11 | Status: SHIPPED | OUTPATIENT
Start: 2024-09-11

## 2024-09-22 ENCOUNTER — TELEPHONE (OUTPATIENT)
Dept: CARDIOLOGY | Facility: CLINIC | Age: 59
End: 2024-09-22
Payer: COMMERCIAL

## (undated) DEVICE — ANGIO-SEAL VIP VASCULAR CLOSURE DEVICE: Brand: ANGIO-SEAL

## (undated) DEVICE — Device: Brand: FIELDER FC

## (undated) DEVICE — MODEL BT2000 P/N 700287-012KIT CONTENTS: MANIFOLD WITH SALINE AND CONTRAST PORTS, SALINE TUBING WITH SPIKE AND HAND SYRINGE, TRANSDUCER: Brand: BT2000 AUTOMATED MANIFOLD KIT

## (undated) DEVICE — KT MANIFOLD CATHLAB CUST

## (undated) DEVICE — MODEL AT P65, P/N 701554-001KIT CONTENTS: HAND CONTROLLER, 3-WAY HIGH-PRESSURE STOPCOCK WITH ROTATING END AND PREMIUM HIGH-PRESSURE TUBING: Brand: ANGIOTOUCH® KIT

## (undated) DEVICE — ST ACC MICROPUNCTURE .018 TRANSLSS/SS/TP 5F/10CM 21G/7CM

## (undated) DEVICE — SKIN PREP TRAY W/CHG: Brand: MEDLINE INDUSTRIES, INC.

## (undated) DEVICE — Device: Brand: ASAHI SION BLUE

## (undated) DEVICE — COPILOT BLEEDBACK CONTROL VALVE: Brand: COPILOT

## (undated) DEVICE — DEV INFL MONARCH 25W

## (undated) DEVICE — Device: Brand: FIELDER XT

## (undated) DEVICE — GW J TP FIX CORE .035 150

## (undated) DEVICE — CATH DIAG EXPO M/ PK 6FR FL4/FR4 PIG 3PK

## (undated) DEVICE — Device: Brand: CORSAIR PRO

## (undated) DEVICE — CVR TRANSD FLX 3DIMEN 14X29.2CM LF STRL

## (undated) DEVICE — GUIDE CATHETER: Brand: MACH1™

## (undated) DEVICE — HI-TORQUE WHISPER MS GUIDE WIRE .014 STRAIGHT TIP 3.0 CM X 300 CM: Brand: HI-TORQUE WHISPER

## (undated) DEVICE — GW PERIPH VASC ADX J/TP SS .035 150CM 3MM

## (undated) DEVICE — INTRO SHEATH ART/FEM ENGAGE .038 6F12CM

## (undated) DEVICE — HI-TORQUE PILOT 200 GUIDE WIRE .014 STRAIGHT TIP 3.0 CM X 190 CM: Brand: HI-TORQUE PILOT

## (undated) DEVICE — GW LUGE .014 300CM

## (undated) DEVICE — PK CATH CARD 10

## (undated) DEVICE — PINNACLE INTRODUCER SHEATH: Brand: PINNACLE

## (undated) DEVICE — STARCLOSE SE VASCULAR CLOSURE SYSTEM: Brand: STARCLOSE SE

## (undated) DEVICE — CATH DIAG EXPO .056 FL4 6F 100CM

## (undated) DEVICE — THE VENTURE CATHETER IS INDICATED FOR DIRECTING, STEERING, CONTROLLING, AND SUPPORTING A GUIDEWIRE TO ACCESS DISCRETE REGIONS OF THE CORONARY AND PERIPHERAL VASCULATURE. IT MAY ALSO BE USED FOR MANUAL DELIVERY OF SALINE SOLUTION OR DIAGNOSTIC CONTRAST AGENTS.: Brand: VENTURE® OTW CATHETER

## (undated) DEVICE — NDL PERC 1PRT THNWALL W/BASEPLT 18G 7CM